# Patient Record
Sex: FEMALE | Race: ASIAN | NOT HISPANIC OR LATINO | URBAN - METROPOLITAN AREA
[De-identification: names, ages, dates, MRNs, and addresses within clinical notes are randomized per-mention and may not be internally consistent; named-entity substitution may affect disease eponyms.]

---

## 2024-11-18 ENCOUNTER — EMERGENCY (EMERGENCY)
Facility: HOSPITAL | Age: 29
LOS: 0 days | Discharge: HOME | End: 2024-11-18
Attending: STUDENT IN AN ORGANIZED HEALTH CARE EDUCATION/TRAINING PROGRAM
Payer: MEDICAID

## 2024-11-18 VITALS
HEART RATE: 86 BPM | RESPIRATION RATE: 19 BRPM | DIASTOLIC BLOOD PRESSURE: 77 MMHG | OXYGEN SATURATION: 99 % | TEMPERATURE: 98 F | WEIGHT: 110.23 LBS | SYSTOLIC BLOOD PRESSURE: 99 MMHG | HEIGHT: 62 IN

## 2024-11-18 VITALS
OXYGEN SATURATION: 97 % | HEART RATE: 78 BPM | DIASTOLIC BLOOD PRESSURE: 67 MMHG | RESPIRATION RATE: 18 BRPM | SYSTOLIC BLOOD PRESSURE: 101 MMHG

## 2024-11-18 DIAGNOSIS — Z3A.13 13 WEEKS GESTATION OF PREGNANCY: ICD-10-CM

## 2024-11-18 DIAGNOSIS — R11.0 NAUSEA: ICD-10-CM

## 2024-11-18 DIAGNOSIS — O99.891 OTHER SPECIFIED DISEASES AND CONDITIONS COMPLICATING PREGNANCY: ICD-10-CM

## 2024-11-18 LAB — HCG SERPL-ACNC: HIGH MIU/ML

## 2024-11-18 PROCEDURE — 99284 EMERGENCY DEPT VISIT MOD MDM: CPT

## 2024-11-18 PROCEDURE — 76801 OB US < 14 WKS SINGLE FETUS: CPT | Mod: 26

## 2024-11-18 NOTE — ED ADULT NURSE NOTE - OBJECTIVE STATEMENT
A&ox4 Patient C/O wants Ultrasound due to positive pregnancy test/ LMP 8/18/2024 no pain/ cramping/bleeding  / urinary symptoms/fevers patient C/O N/V no pmh A&ox4 Patient C/O wants Ultrasound due to positive pregnancy test/ LMP 8/18/2024 no pain/ cramping/bleeding  / urinary symptoms/fevers patient C/O N/V no pmh  used # [id number] Maria Eugenia

## 2024-11-18 NOTE — ED PROVIDER NOTE - CARE PROVIDERS DIRECT ADDRESSES
,DirectAddress_Unknown,[email or web address]

## 2024-11-18 NOTE — ED ADULT TRIAGE NOTE - CHIEF COMPLAINT QUOTE
pt states she wants a pregnancy test to confirm a pregnancy . last period was on august 18th. denies abdominal pain or vaginal bleeding. no history.

## 2024-11-18 NOTE — ED PROVIDER NOTE - PROVIDER TOKENS
PROVIDER:[TOKEN:[[id number]]],PROVIDER:[TOKEN:[[id number]]],PROVIDER:[TOKEN:[[id number]]]

## 2024-11-18 NOTE — ED PROVIDER NOTE - PHYSICAL EXAMINATION
General appearance: Nontoxic appearing, conversant, afebrile    Eyes: anicteric sclerae, MAXINE, EOMI   HENT: Atraumatic; oropharynx clear, MMM and no ulcerations, no pharyngeal erythema or exudate   Neck: Trachea midline; Full range of motion, supple   Pulm: normal respiratory effort and no intercostal retractions, normal work of breathing   Abdomen: Soft, non-tender, non-distended; no guarding or rebound   Extremities: No peripheral edema, no gross deformities, FROM x4   Skin: Dry, normal temperature, turgor and texture; no rash   Psych: Appropriate affect, cooperative

## 2024-11-18 NOTE — ED PROVIDER NOTE - CARE PROVIDER_API CALL
Sandy Galeana  Obstetrics and Gynecology  [address] [place] [address]  Phone: [phone]  Fax: [phone]  Follow Up Time:     Yasmine Avilez  Obstetrics and Gynecology  [address], [address]  [place] [address]  Phone: [phone]  Fax: [phone]  Follow Up Time:     Jhonny Cortes  Obstetrics and Gynecology  [address]  [place] [address]  Phone: [phone]  Fax: [phone]  Follow Up Time:

## 2024-11-18 NOTE — ED PROVIDER NOTE - PROGRESS NOTE DETAILS
Imaging with iup, non tender, reviewed results with patient and stressed importance of OB follow up, will dc

## 2024-11-18 NOTE — ED ADULT TRIAGE NOTE - PAIN: PRESENCE, MLM
Quality 226: Preventive Care And Screening: Tobacco Use: Screening And Cessation Intervention: Patient screened for tobacco use and is an ex/non-smoker Detail Level: Detailed Quality 130: Documentation Of Current Medications In The Medical Record: Current Medications Documented Quality 431: Preventive Care And Screening: Unhealthy Alcohol Use - Screening: Patient screened for unhealthy alcohol use using a single question and scores less than 2 times per year denies pain/discomfort (Rating = 0)

## 2024-11-18 NOTE — ED PROVIDER NOTE - CLINICAL SUMMARY MEDICAL DECISION MAKING FREE TEXT BOX
28yo female with no pmh  lmp  presenting with concern for pregnancy.  Patient took at home test 1 month ago which was positive.  Has been overall doing ok, denies complaints exam some nausea at this time.  No bleeding, urinary symptoms, back pain, abdominal pain currently.  Has not seen ob and does not have one.  Nontender.  Will sono, confirm iup.    Chandrika  [id number], [id number]

## 2024-11-18 NOTE — ED PROVIDER NOTE - NSFOLLOWUPINSTRUCTIONS_ED_ALL_ED_FT
Follow up with OB/ gyn - call to make an appointment as soon as possible  Rest, drink plenty of fluids  Advance activity as tolerated  Continue all previously prescribed medications as directed  Follow up with your PMD - bring copies of your results  Return to the ER for severe pain, fevers, bleeding, or other new or concerning symptoms

## 2024-11-18 NOTE — ED PROVIDER NOTE - PATIENT PORTAL LINK FT
You can access the FollowMyHealth Patient Portal offered by [facility] by registering at the following website: [email or web address]. By joining [email or web address]’s FollowMyHealth portal, you will also be able to view your health information using other applications (apps) compatible with our system.

## 2025-02-19 ENCOUNTER — OUTPATIENT (OUTPATIENT)
Dept: INPATIENT UNIT | Facility: HOSPITAL | Age: 30
LOS: 1 days | Discharge: HOME | End: 2025-02-19
Payer: MEDICAID

## 2025-02-19 ENCOUNTER — APPOINTMENT (OUTPATIENT)
Dept: ANTEPARTUM | Facility: CLINIC | Age: 30
End: 2025-02-19

## 2025-02-19 ENCOUNTER — ASOB RESULT (OUTPATIENT)
Age: 30
End: 2025-02-19

## 2025-02-19 VITALS
HEART RATE: 99 BPM | RESPIRATION RATE: 16 BRPM | SYSTOLIC BLOOD PRESSURE: 117 MMHG | DIASTOLIC BLOOD PRESSURE: 68 MMHG | TEMPERATURE: 98 F

## 2025-02-19 VITALS — DIASTOLIC BLOOD PRESSURE: 61 MMHG | SYSTOLIC BLOOD PRESSURE: 107 MMHG | HEART RATE: 87 BPM

## 2025-02-19 DIAGNOSIS — O26.899 OTHER SPECIFIED PREGNANCY RELATED CONDITIONS, UNSPECIFIED TRIMESTER: ICD-10-CM

## 2025-02-19 LAB
APPEARANCE UR: ABNORMAL
BACTERIA # UR AUTO: NEGATIVE /HPF — SIGNIFICANT CHANGE UP
BILIRUB UR-MCNC: NEGATIVE — SIGNIFICANT CHANGE UP
CAST: 1 /LPF — SIGNIFICANT CHANGE UP (ref 0–4)
COLOR SPEC: YELLOW — SIGNIFICANT CHANGE UP
DIFF PNL FLD: NEGATIVE — SIGNIFICANT CHANGE UP
GLUCOSE UR QL: NEGATIVE MG/DL — SIGNIFICANT CHANGE UP
KETONES UR-MCNC: NEGATIVE MG/DL — SIGNIFICANT CHANGE UP
LEUKOCYTE ESTERASE UR-ACNC: NEGATIVE — SIGNIFICANT CHANGE UP
NITRITE UR-MCNC: NEGATIVE — SIGNIFICANT CHANGE UP
PH UR: 6.5 — SIGNIFICANT CHANGE UP (ref 5–8)
PROT UR-MCNC: NEGATIVE MG/DL — SIGNIFICANT CHANGE UP
RBC CASTS # UR COMP ASSIST: 1 /HPF — SIGNIFICANT CHANGE UP (ref 0–4)
REVIEW: SIGNIFICANT CHANGE UP
SP GR SPEC: 1.01 — SIGNIFICANT CHANGE UP (ref 1–1.03)
SQUAMOUS # UR AUTO: 3 /HPF — SIGNIFICANT CHANGE UP (ref 0–5)
UROBILINOGEN FLD QL: 0.2 MG/DL — SIGNIFICANT CHANGE UP (ref 0.2–1)
WBC UR QL: 1 /HPF — SIGNIFICANT CHANGE UP (ref 0–5)

## 2025-02-19 PROCEDURE — 59025 FETAL NON-STRESS TEST: CPT | Mod: 26

## 2025-02-19 PROCEDURE — 99221 1ST HOSP IP/OBS SF/LOW 40: CPT | Mod: 25

## 2025-02-19 RX ORDER — PRENATAL 136/IRON/FOLIC ACID 27 MG-1 MG
0 TABLET ORAL
Refills: 0 | DISCHARGE

## 2025-02-19 NOTE — OB PROVIDER TRIAGE NOTE - NSOBPROVIDERNOTE_OBGYN_ALL_OB_FT
28y/o  @26.3wks gestation presents for r/o pre term labor     -Pre term labor ruled out   -Fetal status reassuring   -Patient has been cleared for discharge home  -Written and verbal discharge instructions provided   -Discharge instructions provided using  ID:   -Patient instructed to return to triage if experiencing vaginal bleeding, vaginal leaking, decrease in fetal movement, headache, chest pain, epigastric pain, blurred vision, dizziness, SOB     D/W Dr. Ronnie Mcgee, NP 28y/o  @26.3wks gestation presents for r/o pre term labor     -Pre term labor ruled out   -Fetal status reassuring   -Patient has been cleared for discharge home  -Written and verbal discharge instructions provided   -Discharge instructions provided using  ID: [id number] (Lisbeth)  -Instructions given on pelvic rest   -Patient instructed to return to triage if experiencing vaginal bleeding, vaginal leaking, decrease in fetal movement, headache, chest pain, epigastric pain, blurred vision, dizziness, SOB     D/W Dr. Ronnie Mcgee, NP 30y/o  @26.3wks gestation presents for r/o pre term labor     -Pre term labor ruled out   -Fetal status reassuring   -Patient has been cleared for discharge home  -Written and verbal discharge instructions provided   -Discharge instructions provided using  ID: [id number] (Lisbeth)  -Instructions given on pelvic rest   -Patient instructed to return to triage if experiencing vaginal bleeding, vaginal leaking, decrease in fetal movement, headache, chest pain, epigastric pain, blurred vision, dizziness, SOB   Discharged home @9891  D/W Dr. Ronnie Mcgee, NP

## 2025-02-19 NOTE — OB PROVIDER TRIAGE NOTE - HISTORY OF PRESENT ILLNESS
[facility]  [place]  [facility]  [place] [address]  Phone: [phone]  Fax: [phone]    Pt Name: Sasha Michelle  Injury Date: 03/15/2001   Employee ID:  Date of Treatment: 02/06/2018   Company: [employer]            Appointment Time: 12:20 PM Arrived: 12:35 PM CST   Appointment Date: 02/06/18 Time Out:1350 PM   Physician: Miguel Staples MD        Office Treatment: Sasha was seen today for back pain.    Diagnoses and all orders for this visit: EXAM, DISABLED UNTIL NEXT OFFICE VISIT    Degeneration of lumbar intervertebral disc  -     ibuprofen (ADVIL,MOTRIN) 600 MG tablet; Take 1 tablet (600 mg total) by mouth every 8 (eight) hours as needed.  -     carisoprodol (SOMA) 350 MG tablet; Take 1 tablet (350 mg total) by mouth 2 (two) times daily as needed for Muscle spasms.  Chronic bilateral low back pain without sciatica  -     lidocaine (LIDODERM) 5 %; Place onto the skin once daily  Sprain of ligaments of lumbar spine, subsequent encounter  -     ibuprofen (ADVIL,MOTRIN) 600 MG tablet; Take 1 tablet (600 mg total) by mouth every 8 (eight) hours as needed.  -     hydrocodone-acetaminophen 10-325mg (NORCO)  mg Tab; Take 1 tablet by mouth every 8 (eight) hours as needed for Pain.     Patient Instructions: Daily home exercises/warm soaks            Return Appointment: 3/6/2018 at 0900 AM       
30y/o  @26.3wks gestation sent from OB office for evaluation.  Patient states she reported pelvic pressure and abdominal pain to OB provider, vaginal exam was done patient was found to be 0.5 cm.  Patient denies any current pressure and reports on and off pain on the left side but non at this moment.  Denies vaginal leaking, vaginal bleeding, headache, chest pain, epigastric pain, blurred vision, dizziness, SOB.  Endorses +    ID: Lori [id number]    PNC: Dr. Samson, low risk   Seen in ER 24 for abdominal pain, sonogram was done FHR 158bpm, anterior placenta

## 2025-02-19 NOTE — OB PROVIDER TRIAGE NOTE - NSHPPHYSICALEXAM_GEN_ALL_CORE
T(C): 36.9 (02-19-25 @ 21:32), Max: 36.9 (02-19-25 @ 21:18)  HR: 87 (02-19-25 @ 22:30) (87 - 99)  BP: 107/61 (02-19-25 @ 22:30) (103/63 - 117/68)  RR: 16 (02-19-25 @ 21:18) (16 - 16)    Physical Exam  Gen: NAD  Cardiac: RRR  Pulm: Unlabored, breathing comfortably on room air  Abdomen: soft, nontender, gravid    TAUS: cephalic, Anterior placenta, MVP 4.25,   bpm via m-mode, images saved in ASOB  TVUS: cervical length 3.85cm, no funneling or dynamic changes, images saved in ASOB  SSE: Negative bleeding, Negative pooling, Cervix appears 0.5cm  VE: 0.5/30/-3 (externally os 0.5cm internal os is closed)  EFM: 150bpm/ moderate variability/ +accels, no decels/ Reactive NST   [place]: No contractions

## 2025-02-19 NOTE — OB PROVIDER TRIAGE NOTE - ADDITIONAL INSTRUCTIONS
Follow up with OB provider within one week.  Continue to eat a regular diet and drink plenty of fluid. Return to hospital if you experience cramping, contractions, leaking of vaginal fluid, vaginal bleeding, or a decrease/absence of your baby's movements.

## 2025-02-19 NOTE — OB PROVIDER TRIAGE NOTE - NSHPLABSRESULTS_GEN_ALL_CORE
Urinalysis Basic - ( 2025 22:00 )    Color: Yellow / Appearance: Cloudy / S.012 / pH: x  Gluc: x / Ketone: Negative mg/dL  / Bili: Negative / Urobili: 0.2 mg/dL   Blood: x / Protein: Negative mg/dL / Nitrite: Negative   Leuk Esterase: Negative / RBC: 1 /HPF / WBC 1 /HPF   Sq Epi: x / Non Sq Epi: 3 /HPF / Bacteria: Negative /HPF

## 2025-02-19 NOTE — OB RN TRIAGE NOTE - FALL HARM RISK - FALL HARM RISK
Emergency Department Nursing Plan of Care       The Nursing Plan of Care is developed from the Nursing assessment and Emergency Department Attending provider initial evaluation. The plan of care may be reviewed in the ED Provider note.     The Plan of Care was developed with the following considerations:   Patient / Family readiness to learn indicated by:verbalized understanding  Persons(s) to be included in education: patient  Barriers to Learning/Limitations:No    Signed     Neo Higginbotham RN    7/16/2022   1:21 PM No indicators present

## 2025-02-20 DIAGNOSIS — Z3A.26 26 WEEKS GESTATION OF PREGNANCY: ICD-10-CM

## 2025-05-13 ENCOUNTER — OUTPATIENT (OUTPATIENT)
Dept: OUTPATIENT SERVICES | Facility: HOSPITAL | Age: 30
LOS: 1 days | End: 2025-05-13

## 2025-05-13 VITALS
HEIGHT: 61 IN | DIASTOLIC BLOOD PRESSURE: 68 MMHG | OXYGEN SATURATION: 99 % | TEMPERATURE: 98 F | WEIGHT: 139.99 LBS | HEART RATE: 79 BPM | RESPIRATION RATE: 16 BRPM | SYSTOLIC BLOOD PRESSURE: 101 MMHG

## 2025-05-13 DIAGNOSIS — O09.93 SUPERVISION OF HIGH RISK PREGNANCY, UNSPECIFIED, THIRD TRIMESTER: ICD-10-CM

## 2025-05-13 PROBLEM — Z00.00 ENCOUNTER FOR PREVENTIVE HEALTH EXAMINATION: Status: ACTIVE | Noted: 2025-05-13

## 2025-05-13 LAB
ALBUMIN SERPL ELPH-MCNC: 3.6 G/DL — SIGNIFICANT CHANGE UP (ref 3.3–5)
ALP SERPL-CCNC: 226 U/L — HIGH (ref 40–120)
ALT FLD-CCNC: 14 U/L — SIGNIFICANT CHANGE UP (ref 10–40)
ANION GAP SERPL CALC-SCNC: 17 MMOL/L — SIGNIFICANT CHANGE UP (ref 5–17)
APPEARANCE UR: CLEAR — SIGNIFICANT CHANGE UP
AST SERPL-CCNC: 28 U/L — SIGNIFICANT CHANGE UP (ref 10–35)
BACTERIA # UR AUTO: ABNORMAL /HPF
BILIRUB SERPL-MCNC: 0.2 MG/DL — SIGNIFICANT CHANGE UP (ref 0.2–1.2)
BILIRUB UR-MCNC: NEGATIVE — SIGNIFICANT CHANGE UP
BLD GP AB SCN SERPL QL: NEGATIVE — SIGNIFICANT CHANGE UP
BUN SERPL-MCNC: 7 MG/DL — SIGNIFICANT CHANGE UP (ref 7–23)
CALCIUM SERPL-MCNC: 9.3 MG/DL — SIGNIFICANT CHANGE UP (ref 8.4–10.5)
CAST: 0 /LPF — SIGNIFICANT CHANGE UP (ref 0–4)
CHLORIDE SERPL-SCNC: 103 MMOL/L — SIGNIFICANT CHANGE UP (ref 96–108)
CO2 SERPL-SCNC: 18 MMOL/L — LOW (ref 22–31)
COLOR SPEC: YELLOW — SIGNIFICANT CHANGE UP
CREAT SERPL-MCNC: 0.4 MG/DL — LOW (ref 0.5–1.3)
CRP SERPL-MCNC: 7 MG/L — HIGH
DIFF PNL FLD: NEGATIVE — SIGNIFICANT CHANGE UP
EGFR: 137 ML/MIN/1.73M2 — SIGNIFICANT CHANGE UP
EGFR: 137 ML/MIN/1.73M2 — SIGNIFICANT CHANGE UP
FERRITIN SERPL-MCNC: 12 NG/ML — LOW (ref 15–150)
FOLATE SERPL-MCNC: 10 NG/ML — SIGNIFICANT CHANGE UP
GLUCOSE SERPL-MCNC: 79 MG/DL — SIGNIFICANT CHANGE UP (ref 70–99)
GLUCOSE UR QL: NEGATIVE MG/DL — SIGNIFICANT CHANGE UP
HCT VFR BLD CALC: 29.2 % — LOW (ref 34.5–45)
HGB BLD-MCNC: 8.4 G/DL — LOW (ref 11.5–15.5)
IRON SATN MFR SERPL: 296 UG/DL — HIGH (ref 30–160)
IRON SATN MFR SERPL: 50 % — SIGNIFICANT CHANGE UP (ref 14–50)
KETONES UR QL: NEGATIVE MG/DL — SIGNIFICANT CHANGE UP
LEUKOCYTE ESTERASE UR-ACNC: ABNORMAL
MCHC RBC-ENTMCNC: 23 PG — LOW (ref 27–34)
MCHC RBC-ENTMCNC: 28.8 G/DL — LOW (ref 32–36)
MCV RBC AUTO: 80 FL — SIGNIFICANT CHANGE UP (ref 80–100)
NITRITE UR-MCNC: NEGATIVE — SIGNIFICANT CHANGE UP
PH UR: 7 — SIGNIFICANT CHANGE UP (ref 5–8)
PLATELET # BLD AUTO: 321 K/UL — SIGNIFICANT CHANGE UP (ref 150–400)
POTASSIUM SERPL-MCNC: 4.4 MMOL/L — SIGNIFICANT CHANGE UP (ref 3.5–5.3)
POTASSIUM SERPL-SCNC: 4.4 MMOL/L — SIGNIFICANT CHANGE UP (ref 3.5–5.3)
PROT SERPL-MCNC: 7.6 G/DL — SIGNIFICANT CHANGE UP (ref 6–8.3)
PROT UR-MCNC: NEGATIVE MG/DL — SIGNIFICANT CHANGE UP
RBC # BLD: 3.65 M/UL — LOW (ref 3.8–5.2)
RBC # FLD: 23.4 % — HIGH (ref 10.3–14.5)
RBC CASTS # UR COMP ASSIST: 1 /HPF — SIGNIFICANT CHANGE UP (ref 0–4)
RETICS #: 132.5 K/UL — HIGH (ref 25–125)
RETICS/RBC NFR: 3.7 % — HIGH (ref 0.5–2.5)
RH IG SCN BLD-IMP: POSITIVE — SIGNIFICANT CHANGE UP
SODIUM SERPL-SCNC: 137 MMOL/L — SIGNIFICANT CHANGE UP (ref 135–145)
SP GR SPEC: 1.01 — SIGNIFICANT CHANGE UP (ref 1–1.03)
SQUAMOUS # UR AUTO: 15 /HPF — HIGH (ref 0–5)
TIBC SERPL-MCNC: 592 UG/DL — HIGH (ref 220–430)
UIBC SERPL-MCNC: 296 UG/DL — SIGNIFICANT CHANGE UP (ref 110–370)
UROBILINOGEN FLD QL: 0.2 MG/DL — SIGNIFICANT CHANGE UP (ref 0.2–1)
VIT B12 SERPL-MCNC: 171 PG/ML — LOW (ref 232–1245)
WBC # BLD: 11.45 K/UL — HIGH (ref 3.8–10.5)
WBC # FLD AUTO: 11.45 K/UL — HIGH (ref 3.8–10.5)
WBC UR QL: 12 /HPF — HIGH (ref 0–5)

## 2025-05-13 NOTE — OB PST NOTE - NSICDXPASTMEDICALHX_GEN_ALL_CORE_FT
PAST MEDICAL HISTORY:  Anemia in pregnancy     GERD (gastroesophageal reflux disease)     Supervision of high risk pregnancy, unspecified, third trimester

## 2025-05-13 NOTE — OB PST NOTE - TEACHING/LEARNING DEVELOPMENTAL CONSIDERATIONS
none Double O-Z Flap Text: The defect edges were debeveled with a #15 scalpel blade.  Given the location of the defect, shape of the defect and the proximity to free margins a Double O-Z flap was deemed most appropriate.  Using a sterile surgical marker, an appropriate transposition flap was drawn incorporating the defect and placing the expected incisions within the relaxed skin tension lines where possible. The area thus outlined was incised deep to adipose tissue with a #15 scalpel blade.  The skin margins were undermined to an appropriate distance in all directions utilizing iris scissors.

## 2025-05-13 NOTE — OB PST NOTE - PROBLEM SELECTOR PLAN 1
Patient is scheduled for Caesarean section- breech presentation on 5/18/2025. Pre-op instructions provided. Pt given verbal and written instructions with teach back on chlorhexidine shampoo, Gatorade, and anesthesia. Pt verbalized understanding with return demonstration.     CBC, T/S and UA done at PST.  Pt advised  to follow OB for all medication instruction.

## 2025-05-13 NOTE — OB PST NOTE - ABILITY TO HEAR (WITH HEARING AID OR HEARING APPLIANCE IF NORMALLY USED):
Adequate: hears normal conversation without difficulty Xelevelynz Pregnancy And Lactation Text: This medication is Pregnancy Category D and is not considered safe during pregnancy.  The risk during breast feeding is also uncertain.

## 2025-05-13 NOTE — OB PST NOTE - FALL HARM RISK - UNIVERSAL INTERVENTIONS
Bed in lowest position, wheels locked, appropriate side rails in place/Call bell, personal items and telephone in reach/Non-slip footwear when patient is out of bed/[place] to call system/Purposeful Proactive Rounding/Room/bathroom lighting operational, light cord in reach

## 2025-05-13 NOTE — OB PST NOTE - HISTORY OF PRESENT ILLNESS
29 year old pregnant female presents to presurgical testing scheduled for Caesarean section- breech presentation on 5/18/2025. Patient denies vaginal bleeding, spotting or leakage of amniotic fluid. Patient denies regular contractions. Patient reports positive fetal movement.

## 2025-05-14 LAB
ANISOCYTOSIS BLD QL: SIGNIFICANT CHANGE UP
BASOPHILS # BLD AUTO: 0.02 K/UL — SIGNIFICANT CHANGE UP (ref 0–0.2)
BASOPHILS NFR BLD AUTO: 0.2 % — SIGNIFICANT CHANGE UP (ref 0–2)
EOSINOPHIL # BLD AUTO: 0.01 K/UL — SIGNIFICANT CHANGE UP (ref 0–0.5)
EOSINOPHIL NFR BLD AUTO: 0.1 % — SIGNIFICANT CHANGE UP (ref 0–6)
IMM GRANULOCYTES NFR BLD AUTO: 0.7 % — SIGNIFICANT CHANGE UP (ref 0–0.9)
LYMPHOCYTES # BLD AUTO: 1.07 K/UL — SIGNIFICANT CHANGE UP (ref 1–3.3)
LYMPHOCYTES # BLD AUTO: 9.3 % — LOW (ref 13–44)
MANUAL SMEAR VERIFICATION: SIGNIFICANT CHANGE UP
MICROCYTES BLD QL: SLIGHT — SIGNIFICANT CHANGE UP
MONOCYTES # BLD AUTO: 0.1 K/UL — SIGNIFICANT CHANGE UP (ref 0–0.9)
MONOCYTES NFR BLD AUTO: 0.9 % — LOW (ref 2–14)
NEUTROPHILS # BLD AUTO: 10.17 K/UL — HIGH (ref 1.8–7.4)
NEUTROPHILS NFR BLD AUTO: 88.8 % — HIGH (ref 43–77)
PLAT MORPH BLD: NORMAL — SIGNIFICANT CHANGE UP
POIKILOCYTOSIS BLD QL AUTO: SLIGHT — SIGNIFICANT CHANGE UP
POLYCHROMASIA BLD QL SMEAR: SLIGHT — SIGNIFICANT CHANGE UP
RBC BLD AUTO: ABNORMAL

## 2025-05-18 ENCOUNTER — INPATIENT (INPATIENT)
Facility: HOSPITAL | Age: 30
LOS: 3 days | Discharge: HOME | End: 2025-05-22
Attending: OBSTETRICS & GYNECOLOGY | Admitting: OBSTETRICS & GYNECOLOGY
Payer: MEDICAID

## 2025-05-18 VITALS
HEART RATE: 78 BPM | RESPIRATION RATE: 18 BRPM | WEIGHT: 141.98 LBS | SYSTOLIC BLOOD PRESSURE: 118 MMHG | HEIGHT: 61 IN | TEMPERATURE: 98 F | DIASTOLIC BLOOD PRESSURE: 80 MMHG

## 2025-05-18 DIAGNOSIS — O09.93 SUPERVISION OF HIGH RISK PREGNANCY, UNSPECIFIED, THIRD TRIMESTER: ICD-10-CM

## 2025-05-18 LAB
BASOPHILS # BLD AUTO: 0.06 K/UL — SIGNIFICANT CHANGE UP (ref 0–0.2)
BASOPHILS NFR BLD AUTO: 0.5 % — SIGNIFICANT CHANGE UP (ref 0–2)
BLD GP AB SCN SERPL QL: NEGATIVE — SIGNIFICANT CHANGE UP
EOSINOPHIL # BLD AUTO: 0.13 K/UL — SIGNIFICANT CHANGE UP (ref 0–0.5)
EOSINOPHIL NFR BLD AUTO: 1.1 % — SIGNIFICANT CHANGE UP (ref 0–6)
HCT VFR BLD CALC: 31.3 % — LOW (ref 34.5–45)
HGB BLD-MCNC: 9.2 G/DL — LOW (ref 11.5–15.5)
IANC: 8.22 K/UL — HIGH (ref 1.8–7.4)
IMM GRANULOCYTES NFR BLD AUTO: 0.9 % — SIGNIFICANT CHANGE UP (ref 0–0.9)
LYMPHOCYTES # BLD AUTO: 2.57 K/UL — SIGNIFICANT CHANGE UP (ref 1–3.3)
LYMPHOCYTES # BLD AUTO: 21.8 % — SIGNIFICANT CHANGE UP (ref 13–44)
MCHC RBC-ENTMCNC: 23.9 PG — LOW (ref 27–34)
MCHC RBC-ENTMCNC: 29.4 G/DL — LOW (ref 32–36)
MCV RBC AUTO: 81.3 FL — SIGNIFICANT CHANGE UP (ref 80–100)
MONOCYTES # BLD AUTO: 0.71 K/UL — SIGNIFICANT CHANGE UP (ref 0–0.9)
MONOCYTES NFR BLD AUTO: 6 % — SIGNIFICANT CHANGE UP (ref 2–14)
NEUTROPHILS # BLD AUTO: 8.22 K/UL — HIGH (ref 1.8–7.4)
NEUTROPHILS NFR BLD AUTO: 69.7 % — SIGNIFICANT CHANGE UP (ref 43–77)
NRBC # BLD AUTO: 0.02 K/UL — HIGH (ref 0–0)
NRBC # FLD: 0.02 K/UL — HIGH (ref 0–0)
NRBC BLD AUTO-RTO: 0 /100 WBCS — SIGNIFICANT CHANGE UP (ref 0–0)
PLATELET # BLD AUTO: 285 K/UL — SIGNIFICANT CHANGE UP (ref 150–400)
RBC # BLD: 3.85 M/UL — SIGNIFICANT CHANGE UP (ref 3.8–5.2)
RBC # FLD: 25.8 % — HIGH (ref 10.3–14.5)
RH IG SCN BLD-IMP: POSITIVE — SIGNIFICANT CHANGE UP
WBC # BLD: 11.8 K/UL — HIGH (ref 3.8–10.5)
WBC # FLD AUTO: 11.8 K/UL — HIGH (ref 3.8–10.5)

## 2025-05-18 RX ORDER — SIMETHICONE 80 MG
80 TABLET,CHEWABLE ORAL EVERY 4 HOURS
Refills: 0 | Status: DISCONTINUED | OUTPATIENT
Start: 2025-05-18 | End: 2025-05-22

## 2025-05-18 RX ORDER — MAGNESIUM HYDROXIDE 400 MG/5ML
30 SUSPENSION ORAL
Refills: 0 | Status: DISCONTINUED | OUTPATIENT
Start: 2025-05-18 | End: 2025-05-22

## 2025-05-18 RX ORDER — MODIFIED LANOLIN 100 %
1 CREAM (GRAM) TOPICAL EVERY 6 HOURS
Refills: 0 | Status: DISCONTINUED | OUTPATIENT
Start: 2025-05-18 | End: 2025-05-22

## 2025-05-18 RX ORDER — OXYCODONE HYDROCHLORIDE 30 MG/1
5 TABLET ORAL ONCE
Refills: 0 | Status: DISCONTINUED | OUTPATIENT
Start: 2025-05-18 | End: 2025-05-22

## 2025-05-18 RX ORDER — HEPARIN SODIUM 1000 [USP'U]/ML
5000 INJECTION INTRAVENOUS; SUBCUTANEOUS EVERY 12 HOURS
Refills: 0 | Status: DISCONTINUED | OUTPATIENT
Start: 2025-05-18 | End: 2025-05-22

## 2025-05-18 RX ORDER — OXYCODONE HYDROCHLORIDE 30 MG/1
5 TABLET ORAL
Refills: 0 | Status: DISCONTINUED | OUTPATIENT
Start: 2025-05-18 | End: 2025-05-22

## 2025-05-18 RX ORDER — SODIUM CHLORIDE 9 G/1000ML
1000 INJECTION, SOLUTION INTRAVENOUS
Refills: 0 | Status: DISCONTINUED | OUTPATIENT
Start: 2025-05-18 | End: 2025-05-18

## 2025-05-18 RX ORDER — OXYTOCIN-SODIUM CHLORIDE 0.9% IV SOLN 30 UNIT/500ML 30-0.9/5 UT/ML-%
167 SOLUTION INTRAVENOUS
Qty: 30 | Refills: 0 | Status: DISCONTINUED | OUTPATIENT
Start: 2025-05-18 | End: 2025-05-18

## 2025-05-18 RX ORDER — NALOXONE HYDROCHLORIDE 0.4 MG/ML
0.1 INJECTION, SOLUTION INTRAMUSCULAR; INTRAVENOUS; SUBCUTANEOUS
Refills: 0 | Status: DISCONTINUED | OUTPATIENT
Start: 2025-05-18 | End: 2025-05-19

## 2025-05-18 RX ORDER — KETOROLAC TROMETHAMINE 30 MG/ML
30 INJECTION, SOLUTION INTRAMUSCULAR; INTRAVENOUS EVERY 6 HOURS
Refills: 0 | Status: DISCONTINUED | OUTPATIENT
Start: 2025-05-18 | End: 2025-05-19

## 2025-05-18 RX ORDER — OXYTOCIN-SODIUM CHLORIDE 0.9% IV SOLN 30 UNIT/500ML 30-0.9/5 UT/ML-%
42 SOLUTION INTRAVENOUS
Qty: 30 | Refills: 0 | Status: DISCONTINUED | OUTPATIENT
Start: 2025-05-18 | End: 2025-05-18

## 2025-05-18 RX ORDER — SENNA 187 MG
2 TABLET ORAL AT BEDTIME
Refills: 0 | Status: DISCONTINUED | OUTPATIENT
Start: 2025-05-18 | End: 2025-05-22

## 2025-05-18 RX ORDER — FERROUS SULFATE 137(45) MG
325 TABLET, EXTENDED RELEASE ORAL DAILY
Refills: 0 | Status: DISCONTINUED | OUTPATIENT
Start: 2025-05-18 | End: 2025-05-20

## 2025-05-18 RX ORDER — DEXAMETHASONE 0.5 MG/1
4 TABLET ORAL EVERY 6 HOURS
Refills: 0 | Status: DISCONTINUED | OUTPATIENT
Start: 2025-05-18 | End: 2025-05-19

## 2025-05-18 RX ORDER — CLOSTRIDIUM TETANI TOXOID ANTIGEN (FORMALDEHYDE INACTIVATED), CORYNEBACTERIUM DIPHTHERIAE TOXOID ANTIGEN (FORMALDEHYDE INACTIVATED), BORDETELLA PERTUSSIS TOXOID ANTIGEN (GLUTARALDEHYDE INACTIVATED), BORDETELLA PERTUSSIS FILAMENTOUS HEMAGGLUTININ ANTIGEN (FORMALDEHYDE INACTIVATED), BORDETELLA PERTUSSIS PERTACTIN ANTIGEN, AND BORDETELLA PERTUSSIS FIMBRIAE 2/3 ANTIGEN 5; 2; 2.5; 5; 3; 5 [LF]/.5ML; [LF]/.5ML; UG/.5ML; UG/.5ML; UG/.5ML; UG/.5ML
0.5 INJECTION, SUSPENSION INTRAMUSCULAR ONCE
Refills: 0 | Status: DISCONTINUED | OUTPATIENT
Start: 2025-05-18 | End: 2025-05-22

## 2025-05-18 RX ORDER — ONDANSETRON HCL/PF 4 MG/2 ML
4 VIAL (ML) INJECTION EVERY 6 HOURS
Refills: 0 | Status: DISCONTINUED | OUTPATIENT
Start: 2025-05-18 | End: 2025-05-19

## 2025-05-18 RX ORDER — CITRIC ACID/SODIUM CITRATE 300-500 MG
30 SOLUTION, ORAL ORAL ONCE
Refills: 0 | Status: COMPLETED | OUTPATIENT
Start: 2025-05-18 | End: 2025-05-18

## 2025-05-18 RX ORDER — PRENATAL 136/IRON/FOLIC ACID 27 MG-1 MG
1 TABLET ORAL DAILY
Refills: 0 | Status: DISCONTINUED | OUTPATIENT
Start: 2025-05-18 | End: 2025-05-22

## 2025-05-18 RX ORDER — ACETAMINOPHEN 500 MG/5ML
975 LIQUID (ML) ORAL
Refills: 0 | Status: DISCONTINUED | OUTPATIENT
Start: 2025-05-18 | End: 2025-05-22

## 2025-05-18 RX ORDER — DIPHENHYDRAMINE HCL 12.5MG/5ML
25 ELIXIR ORAL EVERY 6 HOURS
Refills: 0 | Status: DISCONTINUED | OUTPATIENT
Start: 2025-05-18 | End: 2025-05-22

## 2025-05-18 RX ORDER — IBUPROFEN 200 MG
600 TABLET ORAL EVERY 6 HOURS
Refills: 0 | Status: COMPLETED | OUTPATIENT
Start: 2025-05-18 | End: 2026-04-16

## 2025-05-18 RX ORDER — SODIUM CHLORIDE 9 G/1000ML
1000 INJECTION, SOLUTION INTRAVENOUS
Refills: 0 | Status: DISCONTINUED | OUTPATIENT
Start: 2025-05-18 | End: 2025-05-19

## 2025-05-18 RX ORDER — DROPERIDOL 2.5 MG/ML
0.62 INJECTION, SOLUTION INTRAMUSCULAR; INTRAVENOUS ONCE
Refills: 0 | Status: COMPLETED | OUTPATIENT
Start: 2025-05-18 | End: 2025-05-18

## 2025-05-18 RX ADMIN — Medication 20 MILLIGRAM(S): at 10:35

## 2025-05-18 RX ADMIN — OXYTOCIN-SODIUM CHLORIDE 0.9% IV SOLN 30 UNIT/500ML 42 MILLIUNIT(S)/MIN: 30-0.9/5 SOLUTION at 17:12

## 2025-05-18 RX ADMIN — DROPERIDOL 0.62 MILLIGRAM(S): 2.5 INJECTION, SOLUTION INTRAMUSCULAR; INTRAVENOUS at 17:07

## 2025-05-18 RX ADMIN — SODIUM CHLORIDE 200 MILLILITER(S): 9 INJECTION, SOLUTION INTRAVENOUS at 10:12

## 2025-05-18 RX ADMIN — Medication 4 MILLIGRAM(S): at 15:44

## 2025-05-18 RX ADMIN — Medication 1 APPLICATION(S): at 10:12

## 2025-05-18 RX ADMIN — Medication 30 MILLILITER(S): at 10:15

## 2025-05-18 NOTE — OB PROVIDER DELIVERY SUMMARY - NSSELHIDDEN_OBGYN_ALL_OB_FT
[NS_DeliveryAttending1_OBGYN_ALL_OB_FT:[id number]],[NS_DeliveryAssist1_OBGYN_ALL_OB_FT:[id number]],[NS_DeliveryRN_OBGYN_ALL_OB_FT:[id number]]

## 2025-05-18 NOTE — OB RN DELIVERY SUMMARY - NS_GENERALBABYACOMMENTA_OBGYN_ALL_OB_FT
NICU resus team called as per MD Ornelas for difficult extraction of babies head (2 minutes from uterine incision to delivery)

## 2025-05-18 NOTE — OB PROVIDER H&P - ATTENDING COMMENTS
Yes will send in higher dose.  Let pt know  
reviewed above  primary c section for BREECH  informed consent obtained.  all questions answered

## 2025-05-18 NOTE — OB RN DELIVERY SUMMARY - NS_SEPSISRSKCALC_OBGYN_ALL_OB_FT
EOS calculated successfully. EOS Risk Factor: 0.5/1000 live births ([facility] national incidence); GA=39w;Temp=97.7; ROM=0.033; GBS='Negative'; Antibiotics='No antibiotics or any antibiotics < 2 hrs prior to birth'

## 2025-05-18 NOTE — OB RN PATIENT PROFILE - SUICIDE SCREENING QUESTION 3
Johnson Ramirezstalin [facility] [place]   11/29/2023 12:20 PM Quentin Shandra, PT [facility] [place]   12/1/2023 11:00 AM Quentin Shandra, PT [facility] [place]   12/4/2023 11:00 AM Quentin Shandra, PT [facility]   12/6/2023 10:20 AM Quentin Shandra, PT [facility] [place]   12/8/2023 11:40 AM Quentin Shandra, PT [facility]   12/11/2023 11:00 AM Quentin Leaven[place], PT [facility]   12/13/2023 11:00 AM Kiel Carrillo, [facility] [place]   12/15/2023 11:00 AM Kiel Carrillo, [facility] [place]   12/18/2023 11:00 AM Quentin Shandra, PT [facility] [place]   12/20/2023 11:00 AM Kiel Carrillo, [facility] [place]   12/22/2023 11:40 AM Quentin Shandra, PT [facility] [place]   12/26/2023 11:00 AM Kiel Carrillo, [facility] [place]   12/28/2023 11:40 AM Quentin Shandra, PT [facility]
No

## 2025-05-18 NOTE — OB PROVIDER H&P - NSLOWPPHRISK_OBGYN_A_OB
No previous uterine incision/Bal[place] Pregnancy/Less than or equal to 4 previous vaginal births/No known bleeding disorder/No history of postpartum hemorrhage/No other PPH risks indicated

## 2025-05-18 NOTE — OB PROVIDER DELIVERY SUMMARY - NSLACERATION_OBGYN_ALL_OB
LOV 4/5/2022  FUV 10/5/2022    Last refilled    carbidopa-levodopa (SINEMET)  MG per tablet 405 tablet 2 8/17/2021 4/5/2022       Refill authorized per protocol.  # 405 with  0 refills        
No

## 2025-05-18 NOTE — OB PROVIDER H&P - ASSESSMENT
A/P: Pt is a 30yo  @ 39w0d here for pCS in the setting of breech       1. Admit to Labor and Delivery. Routine Labs. IV Fluids  2. For pCS    Russ Huang, PGY-3  Obstetrics and Gynecology    Discussed with Dr. LULU Ornelas

## 2025-05-18 NOTE — OB RN DELIVERY SUMMARY - NS_CORDBLDREASONA_OBGYN_ALL_OB
66 yo man with PMH of A fib anemia , [place] ESRD on HD T Th Sat sp AVF surgery presenting with anemia acute and LGIB   # ESRD : hd today, standard bath, uf 2 liters as tolerated   # GI bleed,s/p EGD, colonoscopy, diverticular disease, Bach , followed by GI  #ph level noted , start phoslo 1 tablet po q 8 with meals   # BP well controlled   # will start darbo 20 with hd, no venofer elevated sat  # will follow    # ? d/c plan Mother is RH Positive

## 2025-05-18 NOTE — OB RN DELIVERY SUMMARY - NS_RNDELIVATTEST_OBGYN_ALL_OB
Laps, needles and instrument count was correct
You can access the FollowMyHealth Patient Portal offered by [facility] by registering at the following website: [email or web address]. By joining [facility]’s FollowMyHealth portal, you will also be able to view your health information using other applications (apps) compatible with our system.

## 2025-05-18 NOTE — OB RN DELIVERY SUMMARY - NSSELHIDDEN_OBGYN_ALL_OB_FT
[NS_DeliveryAttending1_OBGYN_ALL_OB_FT:[id number]],[NS_DeliveryAssist1_OBGYN_ALL_OB_FT:[id number]],[NS_DeliveryRN_OBGYN_ALL_OB_FT:[id number]]

## 2025-05-18 NOTE — OB PROVIDER H&P - HISTORY OF PRESENT ILLNESS
HPI: Pt is a 30yo  @ 39w0d here for pCS for breech     GBS neg  Estimated fetal weight: 6lbs 4oz by palpation, 2835g    Prenatal care uncomplicated    OBHx:  GynHx: Denies any gynecologic issues  PMHx: Denies  PSHx: Lsc abdi in   Med: PNV, Fe  All: NKDA  Psych: Denies hx of mental health issues  SH: Denies hx of smoking, drinking, or drug usage during the pregnancy    Vital Signs Last 24 Hrs  T(C): 36.5 (18 May 2025 10:07), Max: 36.5 (18 May 2025 09:48)  T(F): 97.7 (18 May 2025 10:07), Max: 97.7 (18 May 2025 09:48)  HR: 78 (18 May 2025 10:12) (78 - 78)  BP: 118/80 (18 May 2025 10:12) (118/80 - 118/80)  BP(mean): --  RR: 18 (18 May 2025 09:48) (18 - 18)  SpO2: --        Sono: Breech w/ head maternal center

## 2025-05-18 NOTE — OB PROVIDER H&P - NSLOWPPHRISK_OBGYN_A_OB_CAL
The patient presented to the clinic C/O constant headaches, dizziness, hands shaking, and fatigue for about 2 weeks.  The aptiten was seen yesterday at an urgent care where her BP medication was changesd per the providers on staff the patient wwas advised to follow up with PCP or ED for a higher level of care and follow up  
6

## 2025-05-18 NOTE — OB RN INTRAOPERATIVE NOTE - NSSELHIDDEN_OBGYN_ALL_OB_FT
[NS_DeliveryAttending1_OBGYN_ALL_OB_FT:[id number]],[NS_DeliveryAssist1_OBGYN_ALL_OB_FT:[id number]],[NS_DeliveryRN_OBGYN_ALL_OB_FT:[id number]==]

## 2025-05-18 NOTE — OB RN PATIENT PROFILE - FALL HARM RISK - UNIVERSAL INTERVENTIONS
Bed in lowest position, wheels locked, appropriate side rails in place/Call bell, personal items and telephone in reach/Instruct patient to call for assistance before getting out of bed or chair/Non-slip footwear when patient is out of bed/[place] to call system/Physically safe environment - no spills, clutter or unnecessary equipment/Purposeful Proactive Rounding/Room/bathroom lighting operational, light cord in reach

## 2025-05-18 NOTE — OB PROVIDER DELIVERY SUMMARY - NSPROVIDERDELIVERYNOTE_OBGYN_ALL_OB_FT
pLTCS for breech presentation   Bladder flap created.   Fetal head dystocia at level of hysterotomy during delivery.   Viable female infant.  Breech presentation.  APGARS 3/9.   Grossly normal uterus, bilateral tubes, and ovaries  Hysterotomy closed in x1 layer w/ 1-0 chromic.   Peritoneum closed w/ chromic.   Rectus muscle reapproximated w/ chromic   Fascia reapproximated w/ 1-0 Vicryl in a running fashion   SubQ reapproximated w/ 2-0 plain gut in a running fashion   Skin reapproximated w/ 3-0 Monocryl on radha.     234/2000/100    Dictation #: [id number] for breech presentation   Bladder flap created.   Fetal head dystocia at level of hysterotomy during delivery.   Viable female infant.  Breech presentation.  APGARS 3/9.   Grossly normal uterus, bilateral tubes, and ovaries  Hysterotomy closed in x1 layer w/ 1-0 chromic.   Peritoneum closed w/ chromic.   Rectus muscle reapproximated w/ chromic   Fascia reapproximated w/ 1-0 Vicryl in a running fashion   SubQ reapproximated w/ 2-0 plain gut in a running fashion   Skin reapproximated w/ 3-0 Monocryl on radha.     234/2000/100    Dictation #: [id number]

## 2025-05-19 LAB
BASOPHILS # BLD AUTO: 0 K/UL — SIGNIFICANT CHANGE UP (ref 0–0.2)
BASOPHILS NFR BLD AUTO: 0 % — SIGNIFICANT CHANGE UP (ref 0–2)
EOSINOPHIL # BLD AUTO: 0 K/UL — SIGNIFICANT CHANGE UP (ref 0–0.5)
EOSINOPHIL NFR BLD AUTO: 0 % — SIGNIFICANT CHANGE UP (ref 0–6)
GIANT PLATELETS BLD QL SMEAR: PRESENT — SIGNIFICANT CHANGE UP
HCT VFR BLD CALC: 27.8 % — LOW (ref 34.5–45)
HGB BLD-MCNC: 8.1 G/DL — LOW (ref 11.5–15.5)
IANC: 19.82 K/UL — HIGH (ref 1.8–7.4)
LYMPHOCYTES # BLD AUTO: 1.78 K/UL — SIGNIFICANT CHANGE UP (ref 1–3.3)
LYMPHOCYTES # BLD AUTO: 7.8 % — LOW (ref 13–44)
MANUAL SMEAR VERIFICATION: SIGNIFICANT CHANGE UP
MCHC RBC-ENTMCNC: 23.8 PG — LOW (ref 27–34)
MCHC RBC-ENTMCNC: 29.1 G/DL — LOW (ref 32–36)
MCV RBC AUTO: 81.8 FL — SIGNIFICANT CHANGE UP (ref 80–100)
METAMYELOCYTES # FLD: 0.9 % — SIGNIFICANT CHANGE UP (ref 0–1)
METAMYELOCYTES NFR BLD: 0.9 % — SIGNIFICANT CHANGE UP (ref 0–1)
MONOCYTES # BLD AUTO: 0.39 K/UL — SIGNIFICANT CHANGE UP (ref 0–0.9)
MONOCYTES NFR BLD AUTO: 1.7 % — LOW (ref 2–14)
NEUTROPHILS # BLD AUTO: 20.48 K/UL — HIGH (ref 1.8–7.4)
NEUTROPHILS NFR BLD AUTO: 87 % — HIGH (ref 43–77)
NEUTS BAND # BLD: 2.6 % — SIGNIFICANT CHANGE UP (ref 0–6)
NEUTS BAND NFR BLD: 2.6 % — SIGNIFICANT CHANGE UP (ref 0–6)
PLAT MORPH BLD: NORMAL — SIGNIFICANT CHANGE UP
PLATELET # BLD AUTO: 294 K/UL — SIGNIFICANT CHANGE UP (ref 150–400)
PLATELET COUNT - ESTIMATE: NORMAL — SIGNIFICANT CHANGE UP
POLYCHROMASIA BLD QL SMEAR: SLIGHT — SIGNIFICANT CHANGE UP
RBC # BLD: 3.4 M/UL — LOW (ref 3.8–5.2)
RBC # FLD: 25.2 % — HIGH (ref 10.3–14.5)
RBC BLD AUTO: NORMAL — SIGNIFICANT CHANGE UP
T PALLIDUM AB TITR SER: NEGATIVE — SIGNIFICANT CHANGE UP
WBC # BLD: 22.86 K/UL — HIGH (ref 3.8–10.5)
WBC # FLD AUTO: 22.86 K/UL — HIGH (ref 3.8–10.5)

## 2025-05-19 RX ORDER — SODIUM CHLORIDE 9 G/1000ML
500 INJECTION, SOLUTION INTRAVENOUS ONCE
Refills: 0 | Status: COMPLETED | OUTPATIENT
Start: 2025-05-19 | End: 2025-05-19

## 2025-05-19 RX ORDER — IBUPROFEN 200 MG
600 TABLET ORAL EVERY 6 HOURS
Refills: 0 | Status: DISCONTINUED | OUTPATIENT
Start: 2025-05-19 | End: 2025-05-22

## 2025-05-19 RX ADMIN — KETOROLAC TROMETHAMINE 30 MILLIGRAM(S): 30 INJECTION, SOLUTION INTRAMUSCULAR; INTRAVENOUS at 12:28

## 2025-05-19 RX ADMIN — KETOROLAC TROMETHAMINE 30 MILLIGRAM(S): 30 INJECTION, SOLUTION INTRAMUSCULAR; INTRAVENOUS at 06:20

## 2025-05-19 RX ADMIN — Medication 975 MILLIGRAM(S): at 09:10

## 2025-05-19 RX ADMIN — Medication 600 MILLIGRAM(S): at 17:38

## 2025-05-19 RX ADMIN — SODIUM CHLORIDE 1000 MILLILITER(S): 9 INJECTION, SOLUTION INTRAVENOUS at 02:27

## 2025-05-19 RX ADMIN — Medication 975 MILLIGRAM(S): at 09:45

## 2025-05-19 RX ADMIN — Medication 325 MILLIGRAM(S): at 12:29

## 2025-05-19 RX ADMIN — HEPARIN SODIUM 5000 UNIT(S): 1000 INJECTION INTRAVENOUS; SUBCUTANEOUS at 12:38

## 2025-05-19 RX ADMIN — Medication 975 MILLIGRAM(S): at 20:28

## 2025-05-19 RX ADMIN — Medication 975 MILLIGRAM(S): at 21:00

## 2025-05-19 RX ADMIN — KETOROLAC TROMETHAMINE 30 MILLIGRAM(S): 30 INJECTION, SOLUTION INTRAMUSCULAR; INTRAVENOUS at 00:54

## 2025-05-19 RX ADMIN — KETOROLAC TROMETHAMINE 30 MILLIGRAM(S): 30 INJECTION, SOLUTION INTRAMUSCULAR; INTRAVENOUS at 00:24

## 2025-05-19 RX ADMIN — KETOROLAC TROMETHAMINE 30 MILLIGRAM(S): 30 INJECTION, SOLUTION INTRAMUSCULAR; INTRAVENOUS at 13:00

## 2025-05-19 RX ADMIN — KETOROLAC TROMETHAMINE 30 MILLIGRAM(S): 30 INJECTION, SOLUTION INTRAMUSCULAR; INTRAVENOUS at 06:46

## 2025-05-19 RX ADMIN — HEPARIN SODIUM 5000 UNIT(S): 1000 INJECTION INTRAVENOUS; SUBCUTANEOUS at 00:24

## 2025-05-19 RX ADMIN — Medication 1 TABLET(S): at 12:30

## 2025-05-19 NOTE — PROGRESS NOTE ADULT - SUBJECTIVE AND OBJECTIVE BOX
POST OP DAY  1  s/p   SECTION    SUBJECTIVE: Doing ok    PAIN SCALE SCORE: [x] Refer to charted pain scores    THERAPY:  [ x] Spinal morphine   [  ] Epidural morphine   [  ] IV PCA Hydromorphone 1 mg/ml    OBJECTIVE:   Vital Signs Last 24 Hrs  T(C): 36.8 (19 May 2025 10:23), Max: 37.3 (19 May 2025 05:35)  T(F): 98.2 (19 May 2025 10:23), Max: 99.1 (19 May 2025 05:35)  HR: 82 (19 May 2025 10:23) (56 - 101)  BP: 100/56 (19 May 2025 10:23) (100/56 - 137/87)  BP(mean): 85 (18 May 2025 19:00) (84 - 103)  RR: 17 (19 May 2025 10:23) (12 - 19)  SpO2: 99% (19 May 2025 10:23) (95% - 100%)    Parameters below as of 19 May 2025 10:23  Patient On (Oxygen Delivery Method): room air                          8.1    22.86 )-----------( 294      ( 19 May 2025 04:40 )             27.8       SEDATION SCORE:	  [ x ] Alert	    [  ] Drowsy        [  ] Arousable	[  ] Asleep	[  ] Unresponsive    Side Effects:	  [ x ] None	     [  ] Nausea        [  ] Pruritus        [  ] Weakness   [  ] Numbness        ASSESSMENT/ PLAN   [   ] Discontinue         [  ] Continue    [ x ] Change to prn Analgesics as per primary service.    DOCUMENTATION & VERIFICATION OF CURRENT MEDS [ x ] Done    COMMENTS: No Headache.

## 2025-05-19 NOTE — PROVIDER CONTACT NOTE (OTHER) - ACTION/TREATMENT ORDERED:
Encourage food and drink. Repeat orthos at 6 am. 1/2 L bolus ordered. Encourage food and drink. Repeat orthos at 6 am. 1/2 L bolus ordered. Do not remove concepcion at this time

## 2025-05-19 NOTE — LACTATION INITIAL EVALUATION - LACTATION INTERVENTIONS
Triple feeding instructions reviewed./initiate/review safe skin-to-skin/initiate/review hand expression/initiate/review techniques for position and latch/post discharge community resources provided/initiate/review supplementation plan due to medical indications/review techniques to increase milk supply/review techniques to manage sore nipples/engorgement/initiate/review breast massage/compression/reviewed components of an effective feeding and at least 8 effective feedings per day required/reviewed importance of monitoring infant diapers, the breastfeeding log, and minimum output each day/reviewed risks of unnecessary formula supplementation/reviewed risks of artificial nipples/reviewed strategies to transition to breastfeeding only/reviewed benefits and recommendations for rooming in/reviewed feeding on demand/by cue at least 8 times a day

## 2025-05-19 NOTE — PROGRESS NOTE ADULT - ASSESSMENT
28y/o POD#1 from pLTCS for breech. .  Patient is currently in stable condition and recovering appropriately postpartum.    #Routine Postpartum Care  - Continue with PO pain management  - Increase ambulation  - Continue regular diet  - Check POD#1 AM CBC  - D/c Lazaro  - Incision dressing removed POD1    Viridiana Denney  PGY-1

## 2025-05-19 NOTE — PROVIDER CONTACT NOTE (OTHER) - RECOMMENDATIONS
Encourage food and drink. Repeat orthos at 6 am Encourage food and drink. Repeat orthos at 6 am. Do not concepcion at this time

## 2025-05-19 NOTE — PROVIDER CONTACT NOTE (OTHER) - ASSESSMENT
Pt A +Ox4. Pt complaining of dizziness. Pt denies SOB, palpitations, blurry vision or chest pain. Pt A +Ox4. Pt complaining of dizziness. Pt denies SOB, palpitations, blurry vision or chest pain. UO adequate

## 2025-05-19 NOTE — PROGRESS NOTE ADULT - SUBJECTIVE AND OBJECTIVE BOX
Postpartum Note,  Section  She is a  29y woman who is now post-operative day: 1    Subjective:  The patient feels well.  She is ambulating.   She is tolerating regular diet.  She denies nausea and vomiting.  She is voiding.  Her pain is controlled.  She reports normal postpartum bleeding.        Physical exam:    Vital Signs Last 24 Hrs  T(C): 36.8 (19 May 2025 10:23), Max: 37.3 (19 May 2025 05:35)  T(F): 98.2 (19 May 2025 10:23), Max: 99.1 (19 May 2025 05:35)  HR: 82 (19 May 2025 10:23) (56 - 101)  BP: 100/56 (19 May 2025 10:23) (100/56 - 137/87)  BP(mean): 85 (18 May 2025 19:00) (84 - 103)  RR: 17 (19 May 2025 10:23) (12 - 19)  SpO2: 99% (19 May 2025 10:23) (95% - 100%)    Parameters below as of 19 May 2025 10:23  Patient On (Oxygen Delivery Method): room air        Gen: NAD  Breast: Soft, nontender, not engorged.  Abdomen: Soft, nontender, no distension , firm uterine fundus at umbilicus.  Incision: Clean, dry, and intact   Pelvic: Normal lochia noted  Ext: No calf tenderness    LABS:                        8.1    22.86 )-----------( 294      ( 19 May 2025 04:40 )             27.8                         9.2    11.80 )-----------( 285      ( 18 May 2025 10:00 )             31.3                   Allergies    No Known Allergies    Intolerances      MEDICATIONS  (STANDING):  acetaminophen     Tablet .. 975 milliGRAM(s) Oral <User Schedule>  diphtheria/tetanus/pertussis (acellular) Vaccine (Adacel) 0.5 milliLiter(s) IntraMuscular once  ferrous    sulfate 325 milliGRAM(s) Oral daily  heparin   Injectable 5000 Unit(s) SubCutaneous every 12 hours  ibuprofen  Tablet. 600 milliGRAM(s) Oral every 6 hours  ketorolac   Injectable 30 milliGRAM(s) IV Push every 6 hours  prenatal multivitamin 1 Tablet(s) Oral daily  senna 2 Tablet(s) Oral at bedtime    MEDICATIONS  (PRN):  dexAMETHasone  Injectable 4 milliGRAM(s) IV Push every 6 hours PRN Nausea  diphenhydrAMINE 25 milliGRAM(s) Oral every 6 hours PRN Pruritus  lanolin Ointment 1 Application(s) Topical every 6 hours PRN Sore Nipples  magnesium hydroxide Suspension 30 milliLiter(s) Oral two times a day PRN Constipation  naloxone Injectable 0.1 milliGRAM(s) IV Push every 3 minutes PRN For ANY of the following changes in patient status:  A. Breaths Per Minute LESS THAN 10, B. Oxygen saturation LESS THAN 90%, C. Sedation score of 6 for Stop After: 4 Times  ondansetron Injectable 4 milliGRAM(s) IV Push every 6 hours PRN Nausea  oxyCODONE    IR 5 milliGRAM(s) Oral every 3 hours PRN Moderate to Severe Pain (4-10)  oxyCODONE    IR 5 milliGRAM(s) Oral once PRN Moderate to Severe Pain (4-10)  simethicone 80 milliGRAM(s) Chew every 4 hours PRN Gas        Assessment and Plan  POD #1s/p  section  Doing well.  Encourage ambulation as no flatus yet

## 2025-05-19 NOTE — PROGRESS NOTE ADULT - SUBJECTIVE AND OBJECTIVE BOX
Patient seen and examined at bedside. No acute complaints, pain controlled with medication. Patient stood up with orthostatic testing and was not dizzy/lightheaded. She is tolerating regular diet. Has not yet passed flatus. Concepcion is still in place. Denies CP, SOB, N/V, HA, dizziness or lightheadedness.    Vital Signs Last 24 Hours  T(C): 37.1 (05-19-25 @ 01:50), Max: 37.1 (05-19-25 @ 01:50)  HR: 72 (05-19-25 @ 01:50) (56 - 88)  BP: 114/69 (05-19-25 @ 01:50) (106/60 - 137/87)  RR: 17 (05-19-25 @ 01:50) (12 - 19)  SpO2: 100% (05-19-25 @ 01:50) (95% - 100%)    I&O's Summary    18 May 2025 07:01  -  19 May 2025 05:24  --------------------------------------------------------  IN: 3100 mL / OUT: 1616 mL / NET: 1484 mL        Physical Exam:  General: NAD  CV: clinically well perfused  Pulm: breathing comfortably on room air   Abdomen: Soft, appropriately-tender, moderately-distended, fundus firm  Incision: Overlying pressure dressing was removed (was moderately saturated with bloof), Pfannenstiel incision with overlying steri strips with no active bleeding   Pelvic: Lochia wnl on pad  Lower extremities: no calf tenderness bilaterally   Gu: concepcion in place     Labs:    Blood Type: AB Positive  Antibody Screen: Negative  RPR: Negative               9.2    11.80 )-----------( 285      ( 05-18 @ 10:00 )             31.3                8.4    11.45 )-----------( 321      ( 05-13 @ 14:27 )             29.2         MEDICATIONS  (STANDING):  acetaminophen     Tablet .. 975 milliGRAM(s) Oral <User Schedule>  diphtheria/tetanus/pertussis (acellular) Vaccine (Adacel) 0.5 milliLiter(s) IntraMuscular once  ferrous    sulfate 325 milliGRAM(s) Oral daily  heparin   Injectable 5000 Unit(s) SubCutaneous every 12 hours  ibuprofen  Tablet. 600 milliGRAM(s) Oral every 6 hours  ketorolac   Injectable 30 milliGRAM(s) IV Push every 6 hours  lactated ringers. 1000 milliLiter(s) (125 mL/Hr) IV Continuous <Continuous>  morphine PF Spinal 0.1 milliGRAM(s) IntraThecal. once  prenatal multivitamin 1 Tablet(s) Oral daily  senna 2 Tablet(s) Oral at bedtime    MEDICATIONS  (PRN):  dexAMETHasone  Injectable 4 milliGRAM(s) IV Push every 6 hours PRN Nausea  diphenhydrAMINE 25 milliGRAM(s) Oral every 6 hours PRN Pruritus  lanolin Ointment 1 Application(s) Topical every 6 hours PRN Sore Nipples  magnesium hydroxide Suspension 30 milliLiter(s) Oral two times a day PRN Constipation  naloxone Injectable 0.1 milliGRAM(s) IV Push every 3 minutes PRN For ANY of the following changes in patient status:  A. Breaths Per Minute LESS THAN 10, B. Oxygen saturation LESS THAN 90%, C. Sedation score of 6 for Stop After: 4 Times  ondansetron Injectable 4 milliGRAM(s) IV Push every 6 hours PRN Nausea  oxyCODONE    IR 5 milliGRAM(s) Oral every 3 hours PRN Moderate to Severe Pain (4-10)  oxyCODONE    IR 5 milliGRAM(s) Oral once PRN Moderate to Severe Pain (4-10)  simethicone 80 milliGRAM(s) Chew every 4 hours PRN Gas

## 2025-05-20 ENCOUNTER — TRANSCRIPTION ENCOUNTER (OUTPATIENT)
Age: 30
End: 2025-05-20

## 2025-05-20 LAB
BASOPHILS # BLD AUTO: 0.06 K/UL — SIGNIFICANT CHANGE UP (ref 0–0.2)
BASOPHILS NFR BLD AUTO: 0.4 % — SIGNIFICANT CHANGE UP (ref 0–2)
EOSINOPHIL # BLD AUTO: 0.07 K/UL — SIGNIFICANT CHANGE UP (ref 0–0.5)
EOSINOPHIL NFR BLD AUTO: 0.5 % — SIGNIFICANT CHANGE UP (ref 0–6)
HCT VFR BLD CALC: 26.6 % — LOW (ref 34.5–45)
HGB BLD-MCNC: 7.6 G/DL — LOW (ref 11.5–15.5)
IANC: 11.14 K/UL — HIGH (ref 1.8–7.4)
IMM GRANULOCYTES NFR BLD AUTO: 0.7 % — SIGNIFICANT CHANGE UP (ref 0–0.9)
LYMPHOCYTES # BLD AUTO: 20.9 % — SIGNIFICANT CHANGE UP (ref 13–44)
LYMPHOCYTES # BLD AUTO: 3.17 K/UL — SIGNIFICANT CHANGE UP (ref 1–3.3)
MCHC RBC-ENTMCNC: 24.1 PG — LOW (ref 27–34)
MCHC RBC-ENTMCNC: 28.6 G/DL — LOW (ref 32–36)
MCV RBC AUTO: 84.4 FL — SIGNIFICANT CHANGE UP (ref 80–100)
MONOCYTES # BLD AUTO: 0.61 K/UL — SIGNIFICANT CHANGE UP (ref 0–0.9)
MONOCYTES NFR BLD AUTO: 4 % — SIGNIFICANT CHANGE UP (ref 2–14)
NEUTROPHILS # BLD AUTO: 11.14 K/UL — HIGH (ref 1.8–7.4)
NEUTROPHILS NFR BLD AUTO: 73.5 % — SIGNIFICANT CHANGE UP (ref 43–77)
NRBC # BLD AUTO: 0 K/UL — SIGNIFICANT CHANGE UP (ref 0–0)
NRBC # FLD: 0 K/UL — SIGNIFICANT CHANGE UP (ref 0–0)
NRBC BLD AUTO-RTO: 0 /100 WBCS — SIGNIFICANT CHANGE UP (ref 0–0)
PLATELET # BLD AUTO: 277 K/UL — SIGNIFICANT CHANGE UP (ref 150–400)
RBC # BLD: 3.15 M/UL — LOW (ref 3.8–5.2)
RBC # FLD: 26.9 % — HIGH (ref 10.3–14.5)
WBC # BLD: 15.15 K/UL — HIGH (ref 3.8–10.5)
WBC # FLD AUTO: 15.15 K/UL — HIGH (ref 3.8–10.5)

## 2025-05-20 PROCEDURE — 93970 EXTREMITY STUDY: CPT | Mod: 26

## 2025-05-20 RX ORDER — IBUPROFEN 200 MG
1 TABLET ORAL
Qty: 0 | Refills: 0 | DISCHARGE
Start: 2025-05-20

## 2025-05-20 RX ORDER — ACETAMINOPHEN 500 MG/5ML
3 LIQUID (ML) ORAL
Qty: 0 | Refills: 0 | DISCHARGE
Start: 2025-05-20

## 2025-05-20 RX ORDER — FERROUS SULFATE 137(45) MG
325 TABLET, EXTENDED RELEASE ORAL THREE TIMES A DAY
Refills: 0 | Status: DISCONTINUED | OUTPATIENT
Start: 2025-05-20 | End: 2025-05-22

## 2025-05-20 RX ORDER — FERROUS SULFATE 137(45) MG
1 TABLET, EXTENDED RELEASE ORAL
Qty: 0 | Refills: 0 | DISCHARGE
Start: 2025-05-20

## 2025-05-20 RX ADMIN — HEPARIN SODIUM 5000 UNIT(S): 1000 INJECTION INTRAVENOUS; SUBCUTANEOUS at 11:41

## 2025-05-20 RX ADMIN — Medication 975 MILLIGRAM(S): at 08:36

## 2025-05-20 RX ADMIN — Medication 975 MILLIGRAM(S): at 03:43

## 2025-05-20 RX ADMIN — Medication 975 MILLIGRAM(S): at 04:14

## 2025-05-20 RX ADMIN — Medication 600 MILLIGRAM(S): at 01:00

## 2025-05-20 RX ADMIN — Medication 975 MILLIGRAM(S): at 16:07

## 2025-05-20 RX ADMIN — Medication 1 TABLET(S): at 11:40

## 2025-05-20 RX ADMIN — Medication 600 MILLIGRAM(S): at 11:40

## 2025-05-20 RX ADMIN — Medication 325 MILLIGRAM(S): at 21:17

## 2025-05-20 RX ADMIN — Medication 600 MILLIGRAM(S): at 12:40

## 2025-05-20 RX ADMIN — Medication 975 MILLIGRAM(S): at 17:00

## 2025-05-20 RX ADMIN — Medication 975 MILLIGRAM(S): at 09:30

## 2025-05-20 RX ADMIN — Medication 600 MILLIGRAM(S): at 05:53

## 2025-05-20 RX ADMIN — Medication 600 MILLIGRAM(S): at 18:01

## 2025-05-20 RX ADMIN — Medication 975 MILLIGRAM(S): at 21:48

## 2025-05-20 RX ADMIN — HEPARIN SODIUM 5000 UNIT(S): 1000 INJECTION INTRAVENOUS; SUBCUTANEOUS at 00:16

## 2025-05-20 RX ADMIN — Medication 325 MILLIGRAM(S): at 11:40

## 2025-05-20 RX ADMIN — Medication 975 MILLIGRAM(S): at 21:18

## 2025-05-20 RX ADMIN — Medication 600 MILLIGRAM(S): at 19:00

## 2025-05-20 RX ADMIN — Medication 2 TABLET(S): at 21:17

## 2025-05-20 RX ADMIN — Medication 600 MILLIGRAM(S): at 00:16

## 2025-05-20 RX ADMIN — Medication 600 MILLIGRAM(S): at 06:33

## 2025-05-20 RX ADMIN — Medication 500 MILLIGRAM(S): at 11:40

## 2025-05-20 NOTE — DISCHARGE NOTE OB - PATIENT PORTAL LINK FT
You can access the FollowMyHealth Patient Portal offered by [facility] by registering at the following website: [email or web address]. By joining [employer]’s FollowMyHealth portal, you will also be able to view your health information using other applications (apps) compatible with our system.

## 2025-05-20 NOTE — DISCHARGE NOTE OB - CARE PLAN
1 Principal Discharge DX:	 delivery delivered  Assessment and plan of treatment:	Follow up in office in 2 weeks for incision check and in 6 weeks for postpartum visit   Principal Discharge DX:	 delivery delivered  Assessment and plan of treatment:	After discharge, please stay on pelvic rest for 6 weeks, meaning no sexual intercourse, no tampons and no douching.  No driving for 2 weeks as women can loose a lot of blood during delivery and there is a possibility of being lightheaded/fainting.  No lifting objects heavier than baby for two weeks.  Expect to have vaginal bleeding/spotting for up to six weeks.  The bleeding should get lighter and more white/light brown with time.  For bleeding soaking more than a pad an hour or passing clots greater than the size of your fist, come in to the emergency department.    Follow up in OB office in 1-2 weeks for incision check.  Call for noticeable increase in redness or swelling at incision, discharge from incision, or opening of skin at incision site

## 2025-05-20 NOTE — PROGRESS NOTE ADULT - ASSESSMENT
Assessment and Plan  29y POD # 2 s/p LTCS, stable.  Acute blood loss anemia noted.  - Inc OOB  - Routine labs  - Continue iron, CBC AM  - Analgesia prn  - Reg diet

## 2025-05-20 NOTE — DISCHARGE NOTE OB - CARE PROVIDER_API CALL
Rafia Ornelas  Obstetrics and Gynecology  [address] [place] [id number]  Phone: [phone]  Fax: [phone]  Follow Up Time:

## 2025-05-20 NOTE — PROVIDER CONTACT NOTE (OTHER) - ASSESSMENT
Pt resting in bed. Upon initial assessment with  Portia ID #[id number], pt complaining of bilateral calf pain. Pt rating calf pain 5/10.

## 2025-05-20 NOTE — PROVIDER CONTACT NOTE (OTHER) - SITUATION
Pt felt dizzy sitting at edge of bed during orthostatics. RN and PCA present in room. Lying cw=236/69, hr=72. Sitting zl=438/64, hr=79. Pt stated she did not eat or drink anything. Pt was
Pt complaining of bilateral calf pain.

## 2025-05-20 NOTE — PROGRESS NOTE ADULT - SUBJECTIVE AND OBJECTIVE BOX
Postpartum Note,  Section    The patient feels well.  She is ambulating, tolerating regular diet, denies nausea and vomiting.  She is voiding.  Her pain is controlled.  She reports normal postpartum bleeding.      Physical exam:    Vital Signs Last 24 Hrs  T(C): 36.5 (20 May 2025 05:59), Max: 37 (19 May 2025 14:50)  T(F): 97.7 (20 May 2025 05:59), Max: 98.6 (19 May 2025 14:50)  HR: 88 (20 May 2025 05:59) (79 - 88)  BP: 102/62 (20 May 2025 05:59) (102/62 - 105/64)  BP(mean): --  RR: 17 (20 May 2025 05:59) (17 - 18)  SpO2: 97% (20 May 2025 05:59) (97% - 100%)    Parameters below as of 19 May 2025 22:00  Patient On (Oxygen Delivery Method): room air        Gen: NAD  Abdomen: Soft, nontender, no distension , firm uterine fundus at umbilicus.  Incision: Clean, dry, and intact  steris  Pelvic: Normal lochia noted  Ext: No calf tenderness      LABS:                        7.6    15.15 )-----------( 277      ( 20 May 2025 06:15 )             26.6                         8.1    22.86 )-----------( 294      ( 19 May 2025 04:40 )             27.8                   Allergies    No Known Allergies    Intolerances      MEDICATIONS  (STANDING):  acetaminophen     Tablet .. 975 milliGRAM(s) Oral <User Schedule>  ascorbic acid 500 milliGRAM(s) Oral daily  diphtheria/tetanus/pertussis (acellular) Vaccine (Adacel) 0.5 milliLiter(s) IntraMuscular once  ferrous    sulfate 325 milliGRAM(s) Oral three times a day  heparin   Injectable 5000 Unit(s) SubCutaneous every 12 hours  ibuprofen  Tablet. 600 milliGRAM(s) Oral every 6 hours  prenatal multivitamin 1 Tablet(s) Oral daily  senna 2 Tablet(s) Oral at bedtime    MEDICATIONS  (PRN):  diphenhydrAMINE 25 milliGRAM(s) Oral every 6 hours PRN Pruritus  lanolin Ointment 1 Application(s) Topical every 6 hours PRN Sore Nipples  magnesium hydroxide Suspension 30 milliLiter(s) Oral two times a day PRN Constipation  oxyCODONE    IR 5 milliGRAM(s) Oral every 3 hours PRN Moderate to Severe Pain (4-10)  oxyCODONE    IR 5 milliGRAM(s) Oral once PRN Moderate to Severe Pain (4-10)  simethicone 80 milliGRAM(s) Chew every 4 hours PRN Gas

## 2025-05-20 NOTE — DISCHARGE NOTE OB - MEDICATION SUMMARY - MEDICATIONS TO TAKE
I will START or STAY ON the medications listed below when I get home from the hospital:    ibuprofen 600 mg oral tablet  -- 1 tab(s) by mouth every 6 hours  -- Indication: For pain    acetaminophen 325 mg oral tablet  -- 3 tab(s) by mouth every 6 hours  -- Indication: For pain    ferrous sulfate 325 mg (65 mg elemental iron) oral tablet  -- 1 tab(s) by mouth 3 times a day  -- Indication: For Anemia in pregnancy    ascorbic acid 500 mg oral tablet  -- 1 tab(s) by mouth once a day  -- Indication: For Anemia in pregnancy

## 2025-05-20 NOTE — PROVIDER CONTACT NOTE (OTHER) - ACTION/TREATMENT ORDERED:
MADDISON Saeed made aware. PA to come to bedside and assess pt. Dopplers of bilateral lower extremities ordered.

## 2025-05-20 NOTE — DISCHARGE NOTE OB - MATERIALS PROVIDED
Immunization Record/Breastfeeding Log/Bottle Feeding Log/Guide to Postpartum Care/Shaken Baby Prevention Handout/Birth Certificate Instructions   Immunization Record/Breastfeeding Log/Bottle Feeding Log/Breastfeeding Mother’s Support Group Information/Guide to Postpartum Care/[facility] Hearing Screen Program/Back To Sleep Handout/Shaken Baby Prevention Handout/Birth Certificate Instructions/Tdap Vaccination (VIS Pub Date: 2012)

## 2025-05-20 NOTE — DISCHARGE NOTE OB - CLICK TO LAUNCH ORM
----- Message from Iraj Pruitt sent at 1/17/2023  2:02 PM CST -----   Name of Who is Calling:     What is the request in detail:  patient request call back in reference to referral to [facility] bariatrics Please contact to further discuss and advise      Can the clinic reply by [facility]:     What Number to Call Back if not in [facility]:  [phone]    
Returned call no answer lm to return call   
.

## 2025-05-20 NOTE — DISCHARGE NOTE OB - PLAN OF CARE
Follow up in office in 2 weeks for incision check and in 6 weeks for postpartum visit After discharge, please stay on pelvic rest for 6 weeks, meaning no sexual intercourse, no tampons and no douching.  No driving for 2 weeks as women can loose a lot of blood during delivery and there is a possibility of being lightheaded/fainting.  No lifting objects heavier than baby for two weeks.  Expect to have vaginal bleeding/spotting for up to six weeks.  The bleeding should get lighter and more white/light brown with time.  For bleeding soaking more than a pad an hour or passing clots greater than the size of your fist, come in to the emergency department.    Follow up in OB office in 1-2 weeks for incision check.  Call for noticeable increase in redness or swelling at incision, discharge from incision, or opening of skin at incision site

## 2025-05-20 NOTE — PROVIDER CONTACT NOTE (OTHER) - BACKGROUND
educated and reinforced prior to orthostatics to eat and drink. Pt placed in bed. Call bell within reach.     Primary C/s 5/18 @1405. ETY=254. Starting H/H=9.2/31.2. Hx; cholecystectomy
Primary c/2 for breech 5/18 @ 1405. . Pt primarily Chandrika speaking

## 2025-05-20 NOTE — CHART NOTE - NSCHARTNOTEFT_GEN_A_CORE
28 y/o, POD#2. Patient assessed at bedside after being by RN regarding patient's c/o B/L calf pain. Patient endorsing 5/10 "tight" B/L calf pain exacerbated on palpation & standing/walking. Physical assessment revealed negative Homans sign, B/L pedal pulses, and no other s/s of DVT noted. Patient endorses decreased ambulaton d/t pain.    Plan:  - Venous dopplers of B/L lower extremities ordered    D/w Dr. Brennan Sewell, MADDISON Singh S: 28yo POD#2 s/p pLTCS. Patient assessed at bedside after being by RN regarding patient's c/o B/L calf pain. Patient endorsing 5/10 "tight" B/L calf pain exacerbated on palpation & standing/walking. Patient endorses decreased ambulation d/t pain.    O:  Vitals:  T(F): 98.5   HR: 84   BP: 98/60   RR: 18   SpO2: 99%       PE:  General: NAD  Extremities: No calf tenderness/edema, negative Homans sign bilaterally. B/L pedal pulses present.    Plan:  - Venous dopplers of B/L lower extremities ordered        D/w Dr. Brennan Sewell, NP  MADDISON Alfaro

## 2025-05-20 NOTE — DISCHARGE NOTE OB - FINANCIAL ASSISTANCE
[facility] provides services at a reduced cost to those who are determined to be eligible through [facility]’s financial assistance program. Information regarding [facility]’s financial assistance program can be found by going to [email or web address] or by calling [phone].

## 2025-05-21 LAB
HCT VFR BLD CALC: 26.1 % — LOW (ref 34.5–45)
HGB BLD-MCNC: 7.5 G/DL — LOW (ref 11.5–15.5)
MCHC RBC-ENTMCNC: 24.4 PG — LOW (ref 27–34)
MCHC RBC-ENTMCNC: 28.7 G/DL — LOW (ref 32–36)
MCV RBC AUTO: 85 FL — SIGNIFICANT CHANGE UP (ref 80–100)
NRBC # BLD AUTO: 0 K/UL — SIGNIFICANT CHANGE UP (ref 0–0)
NRBC # FLD: 0 K/UL — SIGNIFICANT CHANGE UP (ref 0–0)
NRBC BLD AUTO-RTO: 0 /100 WBCS — SIGNIFICANT CHANGE UP (ref 0–0)
PLATELET # BLD AUTO: 297 K/UL — SIGNIFICANT CHANGE UP (ref 150–400)
RBC # BLD: 3.07 M/UL — LOW (ref 3.8–5.2)
RBC # FLD: 26.8 % — HIGH (ref 10.3–14.5)
WBC # BLD: 11.72 K/UL — HIGH (ref 3.8–10.5)
WBC # FLD AUTO: 11.72 K/UL — HIGH (ref 3.8–10.5)

## 2025-05-21 RX ADMIN — Medication 600 MILLIGRAM(S): at 00:32

## 2025-05-21 RX ADMIN — Medication 600 MILLIGRAM(S): at 05:34

## 2025-05-21 RX ADMIN — Medication 600 MILLIGRAM(S): at 06:04

## 2025-05-21 RX ADMIN — Medication 325 MILLIGRAM(S): at 05:34

## 2025-05-21 RX ADMIN — Medication 975 MILLIGRAM(S): at 09:45

## 2025-05-21 RX ADMIN — Medication 600 MILLIGRAM(S): at 18:46

## 2025-05-21 RX ADMIN — Medication 600 MILLIGRAM(S): at 00:02

## 2025-05-21 RX ADMIN — Medication 975 MILLIGRAM(S): at 03:15

## 2025-05-21 RX ADMIN — Medication 975 MILLIGRAM(S): at 03:45

## 2025-05-21 RX ADMIN — Medication 1 TABLET(S): at 12:01

## 2025-05-21 RX ADMIN — Medication 600 MILLIGRAM(S): at 11:59

## 2025-05-21 RX ADMIN — Medication 975 MILLIGRAM(S): at 20:08

## 2025-05-21 RX ADMIN — HEPARIN SODIUM 5000 UNIT(S): 1000 INJECTION INTRAVENOUS; SUBCUTANEOUS at 11:58

## 2025-05-21 RX ADMIN — Medication 600 MILLIGRAM(S): at 12:35

## 2025-05-21 RX ADMIN — HEPARIN SODIUM 5000 UNIT(S): 1000 INJECTION INTRAVENOUS; SUBCUTANEOUS at 00:02

## 2025-05-21 RX ADMIN — Medication 975 MILLIGRAM(S): at 09:11

## 2025-05-21 RX ADMIN — Medication 325 MILLIGRAM(S): at 23:24

## 2025-05-21 RX ADMIN — Medication 600 MILLIGRAM(S): at 18:20

## 2025-05-21 RX ADMIN — Medication 600 MILLIGRAM(S): at 23:24

## 2025-05-21 RX ADMIN — Medication 975 MILLIGRAM(S): at 20:52

## 2025-05-21 RX ADMIN — HEPARIN SODIUM 5000 UNIT(S): 1000 INJECTION INTRAVENOUS; SUBCUTANEOUS at 23:25

## 2025-05-21 NOTE — PROGRESS NOTE ADULT - SUBJECTIVE AND OBJECTIVE BOX
S: 28yo POD#3 s/p pLTCS 2/2 breech presentation. , H/H 9.2/31.3->8.1/27.8->7.6/26.6->7.5/26.1. Patient c/o b/l calf pain; dopplers neg-showing no DVT in either extremity. The patient feels well.  Pain is well controlled. She is tolerating a regular diet and passing flatus. She is voiding spontaneously and ambulating without difficulty. Denies CP/SOB. Denies lightheadedness/dizziness. Denies N/V.    O:  Vitals:  Vital Signs Last 24 Hrs  T(C): 36.7 (21 May 2025 06:00), Max: 37.2 (20 May 2025 14:02)  T(F): 98 (21 May 2025 06:00), Max: 98.9 (20 May 2025 14:02)  HR: 78 (21 May 2025 06:00) (76 - 84)  BP: 104/65 (21 May 2025 06:00) (91/59 - 104/65)  BP(mean): --  RR: 17 (21 May 2025 06:00) (17 - 18)  SpO2: 97% (21 May 2025 06:00) (97% - 99%)    Parameters above as of 20 May 2025 22:11  Patient On (Oxygen Delivery Method): room air        MEDICATIONS  (STANDING):  acetaminophen     Tablet .. 975 milliGRAM(s) Oral <User Schedule>  ascorbic acid 500 milliGRAM(s) Oral daily  diphtheria/tetanus/pertussis (acellular) Vaccine (Adacel) 0.5 milliLiter(s) IntraMuscular once  ferrous    sulfate 325 milliGRAM(s) Oral three times a day  heparin   Injectable 5000 Unit(s) SubCutaneous every 12 hours  ibuprofen  Tablet. 600 milliGRAM(s) Oral every 6 hours  prenatal multivitamin 1 Tablet(s) Oral daily  senna 2 Tablet(s) Oral at bedtime    MEDICATIONS  (PRN):  diphenhydrAMINE 25 milliGRAM(s) Oral every 6 hours PRN Pruritus  lanolin Ointment 1 Application(s) Topical every 6 hours PRN Sore Nipples  magnesium hydroxide Suspension 30 milliLiter(s) Oral two times a day PRN Constipation  oxyCODONE    IR 5 milliGRAM(s) Oral every 3 hours PRN Moderate to Severe Pain (4-10)  oxyCODONE    IR 5 milliGRAM(s) Oral once PRN Moderate to Severe Pain (4-10)  simethicone 80 milliGRAM(s) Chew every 4 hours PRN Gas      LABS:  Blood type: AB Positive  Rubella IgG: RPR: Negative                          7.5[L]   11.72[H] >-----------< 297    ( 05-21 @ 05:39 )             26.1[L]                        7.6[L]   15.15[H] >-----------< 277    ( 05-20 @ 06:15 )             26.6[L]                        8.1[L]   22.86[H] >-----------< 294    ( 05-19 @ 04:40 )             27.8[L]                        9.2[L]   11.80[H] >-----------< 285    ( 05-18 @ 10:00 )             31.3[L]      Physical exam:  Gen: NAD  Abdomen: Soft, nontender, no distension , firm uterine fundus 2-3 below umbilicus.  Incision:  dry, and intact with steris  Pelvic: Normal lochia noted  Ext: c/o b/l calf pain(dopplers neg), no erythema/edema    A/P: 28yo POD#3 s/p pLTCS 2/2 breech presentation. , H/H 9.2/31.3->8.1/27.8->7.6/26.6->7.5/26.1. Patient c/o b/l calf pain; dopplers neg-showing no DVT in either extremity. Patient is stable and is doing well post-operatively.    #Acute on chronic blood loss anemia  -  -H/H 9.2/27.8->7.5/26.1  -Asymptomatic  -Continue iron tid and Vitamin C    #B/L calf pain  -C/O mild b/l calf pain started last night  -Dopplers neg-showing no DVT in either extremity  -Continue Motrin Q6h as pain is likely muscular  -Able to ambulate independently    #Post-Partum  - Continue Motrin, Tylenol, Oxycodone PRN for pain control.  - Encouraged use of abdominal binder  - Increase ambulation; SCDs when in bed. Continue Heparin SQ  - Continue regular diet  - D/C plan-patient is stable but desires d/c tomorrow. F/U 2 weeks for post-partum check    Patient is Chandrika speaking    Name: [facility]   # [id number]    ANUEL Gutiérrez-BC         S: 30yo POD#3 s/p pLTCS 2/2 breech presentation. , H/H 9.2/31.3->8.1/27.8->7.6/26.6->7.5/26.1. Patient c/o b/l calf pain; dopplers neg-showing no DVT in either extremity. The patient feels well.  Pain is well controlled. She is tolerating a regular diet and passing flatus. She is voiding spontaneously and ambulating without difficulty. Denies CP/SOB. Denies lightheadedness/dizziness. Denies N/V.    O:  Vitals:  Vital Signs Last 24 Hrs  T(C): 36.7 (21 May 2025 06:00), Max: 37.2 (20 May 2025 14:02)  T(F): 98 (21 May 2025 06:00), Max: 98.9 (20 May 2025 14:02)  HR: 78 (21 May 2025 06:00) (76 - 84)  BP: 104/65 (21 May 2025 06:00) (91/59 - 104/65)  BP(mean): --  RR: 17 (21 May 2025 06:00) (17 - 18)  SpO2: 97% (21 May 2025 06:00) (97% - 99%)    Parameters above as of 20 May 2025 22:11  Patient On (Oxygen Delivery Method): room air        MEDICATIONS  (STANDING):  acetaminophen     Tablet .. 975 milliGRAM(s) Oral <User Schedule>  ascorbic acid 500 milliGRAM(s) Oral daily  diphtheria/tetanus/pertussis (acellular) Vaccine (Adacel) 0.5 milliLiter(s) IntraMuscular once  ferrous    sulfate 325 milliGRAM(s) Oral three times a day  heparin   Injectable 5000 Unit(s) SubCutaneous every 12 hours  ibuprofen  Tablet. 600 milliGRAM(s) Oral every 6 hours  prenatal multivitamin 1 Tablet(s) Oral daily  senna 2 Tablet(s) Oral at bedtime    MEDICATIONS  (PRN):  diphenhydrAMINE 25 milliGRAM(s) Oral every 6 hours PRN Pruritus  lanolin Ointment 1 Application(s) Topical every 6 hours PRN Sore Nipples  magnesium hydroxide Suspension 30 milliLiter(s) Oral two times a day PRN Constipation  oxyCODONE    IR 5 milliGRAM(s) Oral every 3 hours PRN Moderate to Severe Pain (4-10)  oxyCODONE    IR 5 milliGRAM(s) Oral once PRN Moderate to Severe Pain (4-10)  simethicone 80 milliGRAM(s) Chew every 4 hours PRN Gas      LABS:  Blood type: AB Positive  Rubella IgG: RPR: Negative                          7.5[L]   11.72[H] >-----------< 297    ( 05-21 @ 05:39 )             26.1[L]                        7.6[L]   15.15[H] >-----------< 277    ( 05-20 @ 06:15 )             26.6[L]                        8.1[L]   22.86[H] >-----------< 294    ( 05-19 @ 04:40 )             27.8[L]                        9.2[L]   11.80[H] >-----------< 285    ( 05-18 @ 10:00 )             31.3[L]      Physical exam:  Gen: NAD  Abdomen: Soft, nontender, no distension , firm uterine fundus 2-3 below umbilicus.  Incision:  dry, and intact with steris  Pelvic: Normal lochia noted  Ext: c/o b/l calf pain(dopplers neg), no erythema/edema    A/P: 30yo POD#3 s/p pLTCS 2/2 breech presentation. , H/H 9.2/31.3->8.1/27.8->7.6/26.6->7.5/26.1. Patient c/o b/l calf pain; dopplers neg-showing no DVT in either extremity. Patient is stable and is doing well post-operatively.    #Acute on chronic blood loss anemia  -  -H/H 9.2/27.8->7.5/26.1  -Asymptomatic  -Continue iron tid and Vitamin C    #B/L calf pain  -C/O mild b/l calf pain started last night  -Dopplers neg-showing no DVT in either extremity  -Continue Motrin Q6h as pain is likely muscular  -Able to ambulate independently    #Post-Partum  - WBC downtrending 22->15->11  - Continue Motrin, Tylenol, Oxycodone PRN for pain control.  - Encouraged use of abdominal binder  - Increase ambulation; SCDs when in bed. Continue Heparin SQ  - Continue regular diet  - D/C plan-patient is stable but desires d/c tomorrow. F/U 2 weeks for post-partum check    Patient is Chandrika speaking    Name: [facility]   # [id number]    ANUEL Gutiérrez-BC

## 2025-05-21 NOTE — PROGRESS NOTE ADULT - SUBJECTIVE AND OBJECTIVE BOX
Post Op C/S 3      Pt without complaints    Vital Signs Last 24 Hrs  T(C): 36.6 (21 May 2025 10:09), Max: 37.2 (20 May 2025 14:02)  T(F): 97.9 (21 May 2025 10:09), Max: 98.9 (20 May 2025 14:02)  HR: 76 (21 May 2025 10:09) (76 - 84)  BP: 106/71 (21 May 2025 10:09) (91/59 - 106/71)  BP(mean): --  RR: 18 (21 May 2025 10:09) (17 - 18)  SpO2: 100% (21 May 2025 10:09) (97% - 100%)    Parameters below as of 20 May 2025 22:11  Patient On (Oxygen Delivery Method): room air      MEDICATIONS  (STANDING):  acetaminophen     Tablet .. 975 milliGRAM(s) Oral <User Schedule>  ascorbic acid 500 milliGRAM(s) Oral daily  diphtheria/tetanus/pertussis (acellular) Vaccine (Adacel) 0.5 milliLiter(s) IntraMuscular once  ferrous    sulfate 325 milliGRAM(s) Oral three times a day  heparin   Injectable 5000 Unit(s) SubCutaneous every 12 hours  ibuprofen  Tablet. 600 milliGRAM(s) Oral every 6 hours  prenatal multivitamin 1 Tablet(s) Oral daily  senna 2 Tablet(s) Oral at bedtime    MEDICATIONS  (PRN):  diphenhydrAMINE 25 milliGRAM(s) Oral every 6 hours PRN Pruritus  lanolin Ointment 1 Application(s) Topical every 6 hours PRN Sore Nipples  magnesium hydroxide Suspension 30 milliLiter(s) Oral two times a day PRN Constipation  oxyCODONE    IR 5 milliGRAM(s) Oral every 3 hours PRN Moderate to Severe Pain (4-10)  oxyCODONE    IR 5 milliGRAM(s) Oral once PRN Moderate to Severe Pain (4-10)  simethicone 80 milliGRAM(s) Chew every 4 hours PRN Gas        Abdomen soft  fundus firm  Incision clean dry and intact  extremities non tender  lochia wnl                          7.5    11.72 )-----------( 297      ( 21 May 2025 05:39 )             26.1     Patient doing well    Routine post operative care

## 2025-05-22 VITALS
TEMPERATURE: 99 F | HEART RATE: 82 BPM | SYSTOLIC BLOOD PRESSURE: 113 MMHG | RESPIRATION RATE: 18 BRPM | DIASTOLIC BLOOD PRESSURE: 69 MMHG | OXYGEN SATURATION: 100 %

## 2025-05-22 RX ADMIN — Medication 600 MILLIGRAM(S): at 13:00

## 2025-05-22 RX ADMIN — Medication 500 MILLIGRAM(S): at 12:17

## 2025-05-22 RX ADMIN — Medication 600 MILLIGRAM(S): at 05:38

## 2025-05-22 RX ADMIN — Medication 600 MILLIGRAM(S): at 00:10

## 2025-05-22 RX ADMIN — Medication 600 MILLIGRAM(S): at 12:17

## 2025-05-22 RX ADMIN — Medication 975 MILLIGRAM(S): at 09:36

## 2025-05-22 RX ADMIN — Medication 1 TABLET(S): at 12:16

## 2025-05-22 RX ADMIN — Medication 325 MILLIGRAM(S): at 05:38

## 2025-05-22 RX ADMIN — Medication 975 MILLIGRAM(S): at 02:30

## 2025-05-22 RX ADMIN — Medication 325 MILLIGRAM(S): at 13:41

## 2025-05-22 RX ADMIN — Medication 975 MILLIGRAM(S): at 03:00

## 2025-05-22 RX ADMIN — Medication 600 MILLIGRAM(S): at 06:21

## 2025-05-22 RX ADMIN — Medication 975 MILLIGRAM(S): at 10:15

## 2025-05-22 RX ADMIN — HEPARIN SODIUM 5000 UNIT(S): 1000 INJECTION INTRAVENOUS; SUBCUTANEOUS at 12:17

## 2025-05-22 NOTE — PROGRESS NOTE ADULT - ASSESSMENT
A/P 29y yo s/p LTCS. POD#4d Stable.  - Advised pt to increase OOB for the abdominal distension  - Regular diet  - Analgesia prn  - Discharge planning

## 2025-05-22 NOTE — PROGRESS NOTE ADULT - ASSESSMENT
A/P: 28yo POD#4 s/p LTCS. Patient reports that abdominal distention and bloating have improved. She is passing flatus and having bowel movements. Abdomen soft and non distended on physical exam. Patient is stable and is doing well post-operatively.  - Continue motrin, tylenol, oxycodone PRN for pain control.  - Increase ambulation  - Continue regular diet  - Discharge planning    Brandi Ureña PGY1

## 2025-05-22 NOTE — PROGRESS NOTE ADULT - SUBJECTIVE AND OBJECTIVE BOX
Attending Progress Note POD# 4    Pt is OOB, voiding, tolerating regular diet, pain controlled with medications.  C/o bloating, +flatus, +BM    T(C): 36.7 (25 @ 22:27), Max: 36.9 (25 @ 14:01)  HR: 72 (25 @ 22:27) (72 - 80)  BP: 107/64 (25 @ 22:27) (106/71 - 112/68)  RR: 18 (25 @ 22:27) (18 - 18)  SpO2: 100% (25 @ 22:27) (97% - 100%)    Gen: NAD  Abd: Soft, NT, FF, mildly distended, no rebound or guarding  Inc: C/D/I  Ext: NTBL            acetaminophen     Tablet .. 975 milliGRAM(s) Oral <User Schedule>  ascorbic acid 500 milliGRAM(s) Oral daily  diphenhydrAMINE 25 milliGRAM(s) Oral every 6 hours PRN  diphtheria/tetanus/pertussis (acellular) Vaccine (Adacel) 0.5 milliLiter(s) IntraMuscular once  ferrous    sulfate 325 milliGRAM(s) Oral three times a day  heparin   Injectable 5000 Unit(s) SubCutaneous every 12 hours  ibuprofen  Tablet. 600 milliGRAM(s) Oral every 6 hours  lanolin Ointment 1 Application(s) Topical every 6 hours PRN  magnesium hydroxide Suspension 30 milliLiter(s) Oral two times a day PRN  oxyCODONE    IR 5 milliGRAM(s) Oral every 3 hours PRN  oxyCODONE    IR 5 milliGRAM(s) Oral once PRN  prenatal multivitamin 1 Tablet(s) Oral daily  senna 2 Tablet(s) Oral at bedtime  simethicone 80 milliGRAM(s) Chew every 4 hours PRN

## 2025-05-22 NOTE — PROGRESS NOTE ADULT - SUBJECTIVE AND OBJECTIVE BOX
OB Postpartum Note:  Delivery, POD#4    S: 28yo POD#4 s/p LTCS. The patient feels well.  Pain is well controlled. She is tolerating a regular diet and passing flatus. She is voiding spontaneously, and ambulating without difficulty. Denies CP/SOB. Denies lightheadedness/dizziness. Denies N/V.    O:  Vitals:  Vital Signs Last 24 Hrs  T(C): 36.9 (22 May 2025 06:00), Max: 36.9 (21 May 2025 14:01)  T(F): 98.4 (22 May 2025 06:00), Max: 98.5 (21 May 2025 14:01)  HR: 71 (22 May 2025 06:00) (71 - 80)  BP: 103/70 (22 May 2025 06:00) (103/70 - 112/68)  BP(mean): --  RR: 18 (22 May 2025 06:00) (18 - 18)  SpO2: 100% (22 May 2025 06:00) (97% - 100%)        MEDICATIONS  (STANDING):  acetaminophen     Tablet .. 975 milliGRAM(s) Oral <User Schedule>  ascorbic acid 500 milliGRAM(s) Oral daily  diphtheria/tetanus/pertussis (acellular) Vaccine (Adacel) 0.5 milliLiter(s) IntraMuscular once  ferrous    sulfate 325 milliGRAM(s) Oral three times a day  heparin   Injectable 5000 Unit(s) SubCutaneous every 12 hours  ibuprofen  Tablet. 600 milliGRAM(s) Oral every 6 hours  prenatal multivitamin 1 Tablet(s) Oral daily  senna 2 Tablet(s) Oral at bedtime    MEDICATIONS  (PRN):  diphenhydrAMINE 25 milliGRAM(s) Oral every 6 hours PRN Pruritus  lanolin Ointment 1 Application(s) Topical every 6 hours PRN Sore Nipples  magnesium hydroxide Suspension 30 milliLiter(s) Oral two times a day PRN Constipation  oxyCODONE    IR 5 milliGRAM(s) Oral every 3 hours PRN Moderate to Severe Pain (4-10)  oxyCODONE    IR 5 milliGRAM(s) Oral once PRN Moderate to Severe Pain (4-10)  simethicone 80 milliGRAM(s) Chew every 4 hours PRN Gas      LABS:  Blood type: AB Positive  Rubella IgG: RPR: Negative                          7.5[L]   11.72[H] >-----------< 297    (  @ 05:39 )             26.1[L]                        7.6[L]   15.15[H] >-----------< 277    (  @ 06:15 )             26.6[L]        Physical exam:  Gen: NAD  Abdomen: Soft, nontender, no distension , firm uterine fundus at umbilicus.  Incision: Clean, dry, and intact   Pelvic: Normal lochia noted  Ext: No calf tenderness